# Patient Record
Sex: FEMALE | Race: BLACK OR AFRICAN AMERICAN | NOT HISPANIC OR LATINO | Employment: OTHER | URBAN - METROPOLITAN AREA
[De-identification: names, ages, dates, MRNs, and addresses within clinical notes are randomized per-mention and may not be internally consistent; named-entity substitution may affect disease eponyms.]

---

## 2018-11-06 ENCOUNTER — OFFICE VISIT (OUTPATIENT)
Dept: INTERNAL MEDICINE | Facility: CLINIC | Age: 23
End: 2018-11-06

## 2018-11-06 VITALS
SYSTOLIC BLOOD PRESSURE: 104 MMHG | BODY MASS INDEX: 28.19 KG/M2 | HEART RATE: 82 BPM | OXYGEN SATURATION: 100 % | HEIGHT: 66 IN | WEIGHT: 175.4 LBS | TEMPERATURE: 98.5 F | DIASTOLIC BLOOD PRESSURE: 78 MMHG

## 2018-11-06 DIAGNOSIS — Z00.00 ENCOUNTER FOR ANNUAL PHYSICAL EXAM: Primary | ICD-10-CM

## 2018-11-06 DIAGNOSIS — E53.8 B12 DEFICIENCY: ICD-10-CM

## 2018-11-06 DIAGNOSIS — Z23 NEEDS FLU SHOT: ICD-10-CM

## 2018-11-06 DIAGNOSIS — Z13.29 THYROID DISORDER SCREEN: ICD-10-CM

## 2018-11-06 DIAGNOSIS — E55.9 VITAMIN D DEFICIENCY: ICD-10-CM

## 2018-11-06 DIAGNOSIS — Z13.220 LIPID SCREENING: ICD-10-CM

## 2018-11-06 LAB
25(OH)D3 SERPL-MCNC: 10.2 NG/ML
ALBUMIN SERPL-MCNC: 4.28 G/DL (ref 3.2–4.8)
ALBUMIN/GLOB SERPL: 1.5 G/DL (ref 1.5–2.5)
ALP SERPL-CCNC: 58 U/L (ref 25–100)
ALT SERPL W P-5'-P-CCNC: 9 U/L (ref 7–40)
ANION GAP SERPL CALCULATED.3IONS-SCNC: 4 MMOL/L (ref 3–11)
AST SERPL-CCNC: 15 U/L (ref 0–33)
BILIRUB BLD-MCNC: NEGATIVE MG/DL
BILIRUB SERPL-MCNC: 0.4 MG/DL (ref 0.3–1.2)
BUN BLD-MCNC: 7 MG/DL (ref 9–23)
BUN/CREAT SERPL: 10.3 (ref 7–25)
CALCIUM SPEC-SCNC: 9.1 MG/DL (ref 8.7–10.4)
CHLORIDE SERPL-SCNC: 105 MMOL/L (ref 99–109)
CHOLEST SERPL-MCNC: 179 MG/DL (ref 0–200)
CLARITY, POC: CLEAR
CO2 SERPL-SCNC: 25 MMOL/L (ref 20–31)
COLOR UR: YELLOW
CREAT BLD-MCNC: 0.68 MG/DL (ref 0.6–1.3)
DEPRECATED RDW RBC AUTO: 46.2 FL (ref 37–54)
ERYTHROCYTE [DISTWIDTH] IN BLOOD BY AUTOMATED COUNT: 13.6 % (ref 11.3–14.5)
GFR SERPL CREATININE-BSD FRML MDRD: 131 ML/MIN/1.73
GLOBULIN UR ELPH-MCNC: 2.9 GM/DL
GLUCOSE BLD-MCNC: 80 MG/DL (ref 70–100)
GLUCOSE UR STRIP-MCNC: NEGATIVE MG/DL
HCT VFR BLD AUTO: 34.6 % (ref 34.5–44)
HGB BLD-MCNC: 11.3 G/DL (ref 11.5–15.5)
KETONES UR QL: NEGATIVE
LEUKOCYTE EST, POC: NEGATIVE
MCH RBC QN AUTO: 30.3 PG (ref 27–31)
MCHC RBC AUTO-ENTMCNC: 32.7 G/DL (ref 32–36)
MCV RBC AUTO: 92.8 FL (ref 80–99)
NITRITE UR-MCNC: NEGATIVE MG/ML
PH UR: 6 [PH] (ref 5–8)
PLATELET # BLD AUTO: 387 10*3/MM3 (ref 150–450)
PMV BLD AUTO: 10.1 FL (ref 6–12)
POTASSIUM BLD-SCNC: 4.2 MMOL/L (ref 3.5–5.5)
PROT SERPL-MCNC: 7.2 G/DL (ref 5.7–8.2)
PROT UR STRIP-MCNC: NEGATIVE MG/DL
RBC # BLD AUTO: 3.73 10*6/MM3 (ref 3.89–5.14)
RBC # UR STRIP: NEGATIVE /UL
SODIUM BLD-SCNC: 134 MMOL/L (ref 132–146)
SP GR UR: 1.02 (ref 1–1.03)
TSH SERPL DL<=0.05 MIU/L-ACNC: 1.77 MIU/ML (ref 0.35–5.35)
UROBILINOGEN UR QL: NORMAL
VIT B12 BLD-MCNC: 687 PG/ML (ref 211–911)
WBC NRBC COR # BLD: 4.34 10*3/MM3 (ref 3.5–10.8)

## 2018-11-06 PROCEDURE — 99385 PREV VISIT NEW AGE 18-39: CPT | Performed by: NURSE PRACTITIONER

## 2018-11-06 PROCEDURE — 82465 ASSAY BLD/SERUM CHOLESTEROL: CPT | Performed by: NURSE PRACTITIONER

## 2018-11-06 PROCEDURE — 82306 VITAMIN D 25 HYDROXY: CPT | Performed by: NURSE PRACTITIONER

## 2018-11-06 PROCEDURE — 90471 IMMUNIZATION ADMIN: CPT | Performed by: NURSE PRACTITIONER

## 2018-11-06 PROCEDURE — 90674 CCIIV4 VAC NO PRSV 0.5 ML IM: CPT | Performed by: NURSE PRACTITIONER

## 2018-11-06 PROCEDURE — 82607 VITAMIN B-12: CPT | Performed by: NURSE PRACTITIONER

## 2018-11-06 PROCEDURE — 81003 URINALYSIS AUTO W/O SCOPE: CPT | Performed by: NURSE PRACTITIONER

## 2018-11-06 PROCEDURE — 80053 COMPREHEN METABOLIC PANEL: CPT | Performed by: NURSE PRACTITIONER

## 2018-11-06 PROCEDURE — 84443 ASSAY THYROID STIM HORMONE: CPT | Performed by: NURSE PRACTITIONER

## 2018-11-06 PROCEDURE — 85027 COMPLETE CBC AUTOMATED: CPT | Performed by: NURSE PRACTITIONER

## 2018-11-06 NOTE — PROGRESS NOTES
"Subjective   Eliza Hilliard is a 23 y.o. female here to establish care.  Chief Complaint   Patient presents with   • Establish Care     new patient   • Annual Exam     physical       History of Present Illness   No acute complaints today. Here for annual exam  SBE: Does routinely, denies concerning areas.     Menses- regular, 28 day cycle, used pad and tampons, not overly painful,  Heavy flow for 5 days, no clots.     Health maintenance:   Influenza: TODAY  Tdap: 2016  Pap: declined  Tobacco use: denies  Eye exam: 2017  Dental exam: 3/2018    Diet: \"not very good\"   Eats out about 3 times a week.   Does not eat a lot of fruits and veggies.     Exercise: dances and plays sports once a week.     The following portions of the patient's history were reviewed and updated as appropriate: allergies, current medications, past family history, past medical history, past social history, past surgical history and problem list.    Review of Systems   Constitutional: Negative for appetite change, chills, fatigue and fever.   HENT: Negative for congestion, ear pain, rhinorrhea, sinus pressure and sore throat.    Eyes: Negative for itching and visual disturbance.   Respiratory: Negative for cough, shortness of breath and wheezing.    Cardiovascular: Negative for chest pain, palpitations and leg swelling.   Gastrointestinal: Negative for abdominal pain, constipation, diarrhea, nausea and vomiting.   Endocrine: Negative for cold intolerance and heat intolerance.   Genitourinary: Negative for difficulty urinating, dysuria and hematuria.   Musculoskeletal: Negative for arthralgias, back pain, joint swelling and myalgias.   Skin: Negative for rash and wound.   Allergic/Immunologic: Negative for environmental allergies and food allergies.   Neurological: Negative for dizziness, numbness and headaches.   Hematological: Negative for adenopathy. Does not bruise/bleed easily.   Psychiatric/Behavioral: Negative for dysphoric mood. The patient " "is not nervous/anxious.      Blood pressure 104/78, pulse 82, temperature 98.5 °F (36.9 °C), temperature source Temporal Artery , height 168.7 cm (66.4\"), weight 79.6 kg (175 lb 6.4 oz), SpO2 100 %.    No Known Allergies  Past Medical History:   Diagnosis Date   • Asthma     as a child   • Depression    • Dizziness     has had frequently   • Irregular heartbeat     sometimes has chest pain also     Past Surgical History:   Procedure Laterality Date   • NO PAST SURGERIES       Family History   Problem Relation Age of Onset   • No Known Problems Mother    • No Known Problems Father    • Breast cancer Maternal Aunt 50   • Hypertension Maternal Grandfather      Social History     Social History   • Marital status: Single     Spouse name: N/A   • Number of children: 0   • Years of education: 12+     Occupational History   • unemployed      Social History Main Topics   • Smoking status: Never Smoker   • Smokeless tobacco: Never Used   • Alcohol use 0.6 oz/week     1 Cans of beer per week      Comment: couple times a week   • Drug use: No   • Sexual activity: No     Other Topics Concern   • Not on file     Social History Narrative    Lives awth roommates.     Drives, wears seat belt.      Immunization History   Administered Date(s) Administered   • Tdap 01/01/2016     No current outpatient prescriptions on file.    Objective   Physical Exam   Constitutional: She is oriented to person, place, and time. She appears well-developed and well-nourished. No distress.   HENT:   Head: Normocephalic and atraumatic.   Right Ear: External ear normal.   Left Ear: External ear normal.   Nose: Nose normal.   Mouth/Throat: Oropharynx is clear and moist.   Eyes: Pupils are equal, round, and reactive to light. Conjunctivae and EOM are normal. Right eye exhibits no discharge. Left eye exhibits no discharge. No scleral icterus.   Neck: Normal range of motion. Neck supple. No JVD present. Carotid bruit is not present. No tracheal deviation " present. No thyromegaly present.   Cardiovascular: Normal rate, regular rhythm, normal heart sounds and intact distal pulses.  Exam reveals no gallop and no friction rub.    No murmur heard.  Pulmonary/Chest: Effort normal and breath sounds normal. No respiratory distress. She has no wheezes. She has no rales. She exhibits no tenderness. Right breast exhibits no inverted nipple, no mass, no nipple discharge, no skin change and no tenderness. Left breast exhibits no inverted nipple, no mass, no nipple discharge, no skin change and no tenderness. Breasts are symmetrical. There is no breast swelling.   Abdominal: Soft. Normal appearance and bowel sounds are normal. She exhibits no distension and no mass. There is no hepatosplenomegaly. There is no tenderness. No hernia.   Genitourinary: No breast tenderness, discharge or bleeding.   Musculoskeletal: Normal range of motion. She exhibits no edema, tenderness or deformity.   Lymphadenopathy:        Head (right side): No submental, no submandibular, no tonsillar, no preauricular, no posterior auricular and no occipital adenopathy present.        Head (left side): No submental, no submandibular, no tonsillar, no preauricular, no posterior auricular and no occipital adenopathy present.     She has no cervical adenopathy.     She has no axillary adenopathy.   Neurological: She is alert and oriented to person, place, and time. She has normal reflexes. She displays normal reflexes.   Skin: Skin is warm and dry. Capillary refill takes less than 2 seconds. No rash noted. She is not diaphoretic. No erythema. No pallor.   Psychiatric: She has a normal mood and affect. Her behavior is normal. Thought content normal.   Nursing note and vitals reviewed.      Assessment/Plan   Eliza was seen today for establish care and annual exam.    Diagnoses and all orders for this visit:    Encounter for annual physical exam  -     POC Urinalysis Dipstick, Automated  -     CBC (No Diff)  -      Comprehensive Metabolic Panel  -     TSH  -     Vitamin B12  -     Vitamin D 25 Hydroxy  -     Cholesterol, Total    Needs flu shot  -     Flucelvax Quad=>4Years (1404-1614)    Vitamin D deficiency  -     Vitamin D 25 Hydroxy    B12 deficiency  -     Vitamin B12    Thyroid disorder screen  -     TSH    Lipid screening  -     Cholesterol, Total    BMI 28.0-28.9,adult  -     TSH  -     Cholesterol, Total        No outpatient encounter prescriptions on file as of 11/6/2018.     No facility-administered encounter medications on file as of 11/6/2018.        Imm/screenings: Flu vaccine updated, declined pap today,   Labs sent- will notify of results once available.  Counseling: diet and execise  Return in about 1 year (around 11/6/2019) for Annual.     Plan of care discussed with pt. They verbalized understanding and agreement.

## 2018-11-09 ENCOUNTER — TELEPHONE (OUTPATIENT)
Dept: INTERNAL MEDICINE | Facility: CLINIC | Age: 23
End: 2018-11-09

## 2018-11-09 PROBLEM — E55.9 VITAMIN D DEFICIENCY: Status: ACTIVE | Noted: 2018-11-09

## 2018-11-09 RX ORDER — CHOLECALCIFEROL (VITAMIN D3) 1250 MCG
50000 CAPSULE ORAL
Qty: 8 CAPSULE | Refills: 0 | Status: SHIPPED | OUTPATIENT
Start: 2018-11-09 | End: 2020-01-30

## 2018-11-09 NOTE — TELEPHONE ENCOUNTER
----- Message from VONNIE Ojeda sent at 11/9/2018  1:56 PM EST -----  Please let her know labs in acceptable ranges except the vit d is low. Ill send in vit d3 [id number] units for her to take weekly for 8 weeks then we can recheck labs in about 3 m

## 2020-01-30 ENCOUNTER — OFFICE VISIT (OUTPATIENT)
Dept: FAMILY MEDICINE CLINIC | Facility: CLINIC | Age: 25
End: 2020-01-30

## 2020-01-30 VITALS
HEIGHT: 66 IN | HEART RATE: 124 BPM | BODY MASS INDEX: 29.38 KG/M2 | WEIGHT: 182.8 LBS | DIASTOLIC BLOOD PRESSURE: 68 MMHG | RESPIRATION RATE: 14 BRPM | SYSTOLIC BLOOD PRESSURE: 118 MMHG | OXYGEN SATURATION: 99 %

## 2020-01-30 DIAGNOSIS — R51.9 PERSISTENT HEADACHES: Primary | ICD-10-CM

## 2020-01-30 PROCEDURE — 99213 OFFICE O/P EST LOW 20 MIN: CPT | Performed by: PHYSICIAN ASSISTANT

## 2020-01-30 RX ORDER — SUMATRIPTAN 50 MG/1
TABLET, FILM COATED ORAL
Qty: 9 TABLET | Refills: 11 | Status: SHIPPED | OUTPATIENT
Start: 2020-01-30 | End: 2022-09-19

## 2020-01-30 RX ORDER — LEVONORGESTREL AND ETHINYL ESTRADIOL 0.15-0.03
1 KIT ORAL DAILY
COMMUNITY

## 2020-01-30 NOTE — PROGRESS NOTES
Subjective   Eliza Hilliard is a 24 y.o. female  Establish Care and Headache (Occur every few weeks, no known triggers)      History of Present Illness  Patient is a new patient she is a 24-year-old black female who comes in complaining of persistent headaches over the last couple months she describes her headaches as 6 headache she has unilateral headache with photophobia occasional phonophobia she states these occur about once a week she is tried over-the-counter medication without any relief she states she is under a lot of stress and has some anxiety but these headaches are pretty consistent she describes the headache 7 out of 10.      The following portions of the patient's history were reviewed and updated as appropriate: allergies, current medications, past social history and problem list    Review of Systems   Constitutional: Negative for appetite change, diaphoresis, fatigue and unexpected weight change.   Eyes: Negative for visual disturbance.   Respiratory: Negative for cough, chest tightness and shortness of breath.    Cardiovascular: Negative for chest pain, palpitations and leg swelling.   Gastrointestinal: Negative for diarrhea, nausea and vomiting.   Endocrine: Negative for polydipsia, polyphagia and polyuria.   Skin: Negative for color change and rash.   Neurological: Positive for headaches. Negative for dizziness, syncope, weakness, light-headedness and numbness.       Objective     Vitals:    01/30/20 1441   BP: 118/68   Pulse: (!) 124   Resp: 14   SpO2: 99%       Physical Exam   Constitutional: She appears well-developed and well-nourished.   Neck: Neck supple. No JVD present. No thyromegaly present.   Cardiovascular: Normal rate, regular rhythm, normal heart sounds, intact distal pulses and normal pulses.   No murmur heard.  Pulmonary/Chest: Effort normal and breath sounds normal. No respiratory distress.   Abdominal: Soft. Bowel sounds are normal. There is no hepatosplenomegaly. There is no  tenderness.   Musculoskeletal: She exhibits no edema.   Lymphadenopathy:     She has no cervical adenopathy.   Neurological: No sensory deficit.   Skin: Skin is warm and dry. She is not diaphoretic.   Nursing note and vitals reviewed.      Assessment/Plan     Eliza was seen today for establish care and headache.    Diagnoses and all orders for this visit:    Persistent headaches  -     SUMAtriptan (IMITREX) 50 MG tablet; Take one tablet at onset of headache. May repeat dose one time in 2 hours if headache not relieved.    Follow-up in 2 weeks to recheck

## 2020-02-06 ENCOUNTER — OFFICE VISIT (OUTPATIENT)
Dept: FAMILY MEDICINE CLINIC | Facility: CLINIC | Age: 25
End: 2020-02-06

## 2020-02-06 VITALS
HEART RATE: 79 BPM | RESPIRATION RATE: 14 BRPM | OXYGEN SATURATION: 99 % | HEIGHT: 66 IN | SYSTOLIC BLOOD PRESSURE: 120 MMHG | DIASTOLIC BLOOD PRESSURE: 74 MMHG | BODY MASS INDEX: 29.52 KG/M2

## 2020-02-06 DIAGNOSIS — G43.109 MIGRAINE WITH AURA AND WITHOUT STATUS MIGRAINOSUS, NOT INTRACTABLE: Primary | ICD-10-CM

## 2020-02-06 DIAGNOSIS — F41.1 GAD (GENERALIZED ANXIETY DISORDER): ICD-10-CM

## 2020-02-06 PROCEDURE — 99213 OFFICE O/P EST LOW 20 MIN: CPT | Performed by: PHYSICIAN ASSISTANT

## 2020-02-06 RX ORDER — ESCITALOPRAM OXALATE 10 MG/1
10 TABLET ORAL DAILY
Qty: 30 TABLET | Refills: 11 | Status: SHIPPED | OUTPATIENT
Start: 2020-02-06 | End: 2022-09-19

## 2020-02-06 NOTE — PROGRESS NOTES
Subjective   Eliza Hilliard is a 24 y.o. female  Headache (1 wk f/u since starting Imitrex-working well. )      History of Present Illness  Patient is a pleasant 21-year-old white female who comes in complaining of headache x1 week patient states she started taking Imitrex last week it worked well she states headaches are improved she still had some symptoms of breakthrough anxiety with focus concentration trouble getting outside without being nervous no shortness breath no chest pain  The following portions of the patient's history were reviewed and updated as appropriate: allergies, current medications, past social history and problem list    Review of Systems   Constitutional: Negative for appetite change, diaphoresis, fatigue and unexpected weight change.   Eyes: Negative for visual disturbance.   Respiratory: Negative for cough, chest tightness and shortness of breath.    Cardiovascular: Negative for chest pain, palpitations and leg swelling.   Gastrointestinal: Negative for diarrhea, nausea and vomiting.   Endocrine: Negative for polydipsia, polyphagia and polyuria.   Skin: Negative for color change and rash.   Neurological: Negative for dizziness, syncope, weakness, light-headedness, numbness and headaches.       Objective     Vitals:    02/06/20 0911   BP: 120/74   Pulse: 79   Resp: 14   SpO2: 99%       Physical Exam   Constitutional: She appears well-developed and well-nourished.   Neck: Neck supple. No JVD present. No thyromegaly present.   Cardiovascular: Normal rate, regular rhythm, normal heart sounds, intact distal pulses and normal pulses.   No murmur heard.  Pulmonary/Chest: Effort normal and breath sounds normal. No respiratory distress.   Abdominal: Soft. Bowel sounds are normal. There is no hepatosplenomegaly. There is no tenderness.   Musculoskeletal: She exhibits no edema.   Lymphadenopathy:     She has no cervical adenopathy.   Neurological: No sensory deficit.   Skin: Skin is warm and dry. She  is not diaphoretic.   Nursing note and vitals reviewed.      Assessment/Plan     Eliza was seen today for headache.    Diagnoses and all orders for this visit:    Migraine with aura and without status migrainosus, not intractable    RORY (generalized anxiety disorder)  -     escitalopram (LEXAPRO) 10 MG tablet; Take 1 tablet by mouth Daily.    #1 continue Imitrex    2.  Lexapro 10 mg 1 p.o. every day #30

## 2020-04-03 ENCOUNTER — NURSE TRIAGE (OUTPATIENT)
Dept: CALL CENTER | Facility: HOSPITAL | Age: 25
End: 2020-04-03

## 2020-04-03 NOTE — TELEPHONE ENCOUNTER
"Given the number for the testing center, given suggestions on how to treat the symptoms, is self quarantine, [facility]    Reason for Disposition  • [1] Sore throat with cough/cold symptoms AND [2] present < 5 days  • COVID-19 Testing, questions about    Additional Information  • Negative: Difficulty breathing, severe  • Negative: [1] Wheezing (high pitched whistling sound) AND [2] previous asthma attacks or use of asthma medicines  • Negative: Earache  • Negative: Sinus pain or pressure  • Negative: Cough, productive of sputum (phlegm)  • Negative: Cough, not productive (dry cough)  • Sore throat  • Negative: Severe difficulty breathing (e.g., struggling for each breath, speaks in single words, stridor)  • Negative: Sounds like a life-threatening emergency to the triager  • Negative: [1] Diagnosed strep throat AND [2] taking antibiotic AND [3] symptoms continue  • Negative: Throat culture results, call about  • Negative: Productive cough is main symptom  • Negative: Non-productive cough is main symptom  • Negative: Hoarseness is main symptom  • Negative: Runny nose is main symptom  • Negative: [1] Drooling or spitting out saliva (because can't swallow) AND [2] normal breathing  • Negative: Unable to open mouth completely  • Negative: [1] Difficulty breathing AND [2] not severe  • Negative: Fever > 104 F (40 C)  • Negative: [1] Refuses to drink anything AND [2] for > 12 hours  • Negative: [1] Drinking very little AND [2] dehydration suspected (e.g., no urine > 12 hours, very dry mouth, very lightheaded)  • Negative: Patient sounds very sick or weak to the triager  • Negative: SEVERE (e.g., excruciating) throat pain  • Negative: [1] Pus on tonsils (back of throat) AND [2]  fever AND [3] swollen neck lymph nodes (\"glands\")  • Negative: [1] Rash AND [2] widespread (especially chest and abdomen)  • Negative: Earache also present  • Negative: Fever present > 3 days (72 hours)  • " Negative: Diabetes mellitus or weak immune system (e.g., HIV positive, cancer chemo, splenectomy, organ transplant, chronic steroids)  • Negative: History of rheumatic fever  • Negative: [1] Adult is leaving on a trip AND [2] requests an antibiotic NOW  • Negative: [1] Positive throat culture or rapid strep test (according to lab, PCP, caller, etc.) AND [2] NO  standing order to call in prescription for antibiotic  • Negative: [1] Exposure to family member (or spouse or boyfriend/girlfriend) with test-proven strep AND [2] within last 10 days  • Negative: [1] Sore throat is the only symptom AND [2] present > 48 hours  • Negative: [1] Sore throat with cough/cold symptoms AND [2] present > 5 days  • Negative: [1] Sore throat is the only symptom AND [2] sore throat present < 48 hours  • Negative: SEVERE difficulty breathing (e.g., struggling for each breath, speaks in single words)  • Negative: Difficult to awaken or acting confused (e.g., disoriented, slurred speech)  • Negative: Bluish (or gray) lips or face now  • Negative: Shock suspected (e.g., cold/pale/clammy skin, too weak to stand, low BP, rapid pulse)  • Negative: Sounds like a life-threatening emergency to the triager  • Negative: [1] COVID-19 exposure AND [2] no symptoms  • [1] COVID-19 suspected (e.g., cough, fever, shortness of breath) AND [2] public health department recommends testing  • Negative: COVID-19 and Breastfeeding, questions about  • Negative: SEVERE or constant chest pain (Exception: mild central chest pain, present only when coughing)  • Negative: MODERATE difficulty breathing (e.g., speaks in phrases, SOB even at rest, pulse 100-120)  • Negative: Patient sounds very sick or weak to the triager  • Negative: MILD difficulty breathing (e.g., minimal/no SOB at rest, SOB with walking, pulse <100)  • Negative: Chest pain  • Negative: Fever > 103 F (39.4 C)  • Negative: [1] Fever > 101 F (38.3 C) AND [2] age > 60  • Negative: [1] Fever > 100.0 F  "(37.8 C) AND [2] bedridden (e.g., nursing home patient, CVA, chronic illness, recovering from surgery)  • Negative: HIGH RISK patient (e.g., age > 64 years, diabetes, heart or lung disease, weak immune system)  • Negative: Fever present > 3 days (72 hours)  • Negative: [1] Fever returns after gone for over 24 hours AND [2] symptoms worse or not improved  • Negative: [1] Continuous (nonstop) coughing interferes with work or school AND [2] no improvement using cough treatment per protocol  • Negative: Cough present > 3 weeks  • Negative: 1] COVID-19 infection diagnosed or suspected AND [2] mild symptoms (fever, cough) AND [2] no trouble breathing or other complications  • Negative: COVID-19, questions about  • Negative: COVID-19 Home Isolation, questions about  • Negative: COVID-19 Prevention and Healthy Living, questions about    Answer Assessment - Initial Assessment Questions  1. ONSET: \"When did the throat start hurting?\" (Hours or days ago)       Couple days  2. SEVERITY: \"How bad is the sore throat?\" (Scale 1-10; mild, moderate or severe)    - MILD (1-3):  doesn't interfere with eating or normal activities    - MODERATE (4-7): interferes with eating some solids and normal activities    - SEVERE (8-10):  excruciating pain, interferes with most normal activities    - SEVERE DYSPHAGIA: can't swallow liquids, drooling      moderate  3. STREP EXPOSURE: \"Has there been any exposure to strep within the past week?\" If so, ask: \"What type of contact occurred?\"      no  4.  VIRAL SYMPTOMS: \"Are there any symptoms of a cold, such as a runny nose, cough, hoarse voice or red eyes?\"       Cold, body aches  5. FEVER: \"Do you have a fever?\" If so, ask: \"What is your temperature, how was it measured, and when did it start?\"      99  6. PUS ON THE TONSILS: \"Is there pus on the tonsils in the back of your throat?\"      na  7. OTHER SYMPTOMS: \"Do you have any other symptoms?\" (e.g., difficulty breathing, headache, rash)      Body " "aches  8. PREGNANCY: \"Is there any chance you are pregnant?\" \"When was your last menstrual period?\"      no    Answer Assessment - Initial Assessment Questions  1. COVID-19 DIAGNOSIS: \"Who made your Coronavirus (COVID-19) diagnosis?\" \"Was it confirmed by a positive lab test?\" If not diagnosed by a HCP, ask \"Are there lots of cases (community spread) where you live?\" (See public health department website, if unsure)    * MAJOR community spread: high number of cases; numbers of cases are increasing; many people hospitalized.    * MINOR community spread: low number of cases; not increasing; few or no people hospitalized      na  2. ONSET: \"When did the COVID-19 symptoms start?\"        2 days  3. WORST SYMPTOM: \"What is your worst symptom?\" (e.g., cough, fever, shortness of breath, muscle aches)      bodyaches  4. COUGH: \"How bad is the cough?\"        yes  5. FEVER: \"Do you have a fever?\" If so, ask: \"What is your temperature, how was it measured, and when did it start?\"      no  6. RESPIRATORY STATUS: \"Describe your breathing?\" (e.g., shortness of breath, wheezing, unable to speak)       no  7. BETTER-SAME-WORSE: \"Are you getting better, staying the same or getting worse compared to yesterday?\"  If getting worse, ask, \"In what way?\"     Low grade fecer started today  8. HIGH RISK DISEASE: \"Do you have any chronic medical problems?\" (e.g., asthma, heart or lung disease, weak immune system, etc.)      no  9. PREGNANCY: \"Is there any chance you are pregnant?\" \"When was your last menstrual period?\"      No   10. OTHER SYMPTOMS: \"Do you have any other symptoms?\"  (e.g., runny nose, headache, sore throat, loss of smell)        Sore throat    Protocols used: SORE THROAT-ADULT-AH, CORONAVIRUS (COVID-19) DIAGNOSED OR SUSPECTED-ADULT-AH, RESPIRATORY MULTIPLE SYMPTOMS - GUIDELINE SELECTION-ADULT-AH      "

## 2020-04-06 ENCOUNTER — TELEPHONE (OUTPATIENT)
Dept: CALL CENTER | Facility: HOSPITAL | Age: 25
End: 2020-04-06

## 2022-09-04 ENCOUNTER — APPOINTMENT (OUTPATIENT)
Dept: CT IMAGING | Facility: HOSPITAL | Age: 27
End: 2022-09-04

## 2022-09-04 ENCOUNTER — APPOINTMENT (OUTPATIENT)
Dept: GENERAL RADIOLOGY | Facility: HOSPITAL | Age: 27
End: 2022-09-04

## 2022-09-04 ENCOUNTER — HOSPITAL ENCOUNTER (EMERGENCY)
Facility: HOSPITAL | Age: 27
Discharge: HOME | End: 2022-09-04
Attending: EMERGENCY MEDICINE | Admitting: EMERGENCY MEDICINE

## 2022-09-04 VITALS
TEMPERATURE: 98.1 F | HEIGHT: 66 IN | HEART RATE: 67 BPM | DIASTOLIC BLOOD PRESSURE: 77 MMHG | OXYGEN SATURATION: 99 % | SYSTOLIC BLOOD PRESSURE: 111 MMHG | RESPIRATION RATE: 18 BRPM | WEIGHT: 180 LBS | BODY MASS INDEX: 28.93 KG/M2

## 2022-09-04 DIAGNOSIS — R00.2 PALPITATIONS: ICD-10-CM

## 2022-09-04 DIAGNOSIS — U07.1 COVID-19 VIRUS INFECTION: ICD-10-CM

## 2022-09-04 DIAGNOSIS — R07.9 CHEST PAIN, UNSPECIFIED TYPE: Primary | ICD-10-CM

## 2022-09-04 LAB
ALBUMIN SERPL-MCNC: 4.2 G/DL (ref 3.5–5.2)
ALBUMIN/GLOB SERPL: 1.2 G/DL
ALP SERPL-CCNC: 46 U/L (ref 39–117)
ALT SERPL W P-5'-P-CCNC: 8 U/L (ref 1–33)
ANION GAP SERPL CALCULATED.3IONS-SCNC: 10 MMOL/L (ref 5–15)
AST SERPL-CCNC: 16 U/L (ref 1–32)
B-HCG UR QL: NEGATIVE
BASOPHILS # BLD AUTO: 0.02 10*3/MM3 (ref 0–0.2)
BASOPHILS NFR BLD AUTO: 0.3 % (ref 0–1.5)
BILIRUB SERPL-MCNC: 0.5 MG/DL (ref 0–1.2)
BUN SERPL-MCNC: 7 MG/DL (ref 6–20)
BUN/CREAT SERPL: 8.5 (ref 7–25)
CALCIUM SPEC-SCNC: 9 MG/DL (ref 8.6–10.5)
CHLORIDE SERPL-SCNC: 104 MMOL/L (ref 98–107)
CO2 SERPL-SCNC: 25 MMOL/L (ref 22–29)
CREAT SERPL-MCNC: 0.82 MG/DL (ref 0.57–1)
DEPRECATED RDW RBC AUTO: 41.1 FL (ref 37–54)
EGFRCR SERPLBLD CKD-EPI 2021: 101.3 ML/MIN/1.73
EOSINOPHIL # BLD AUTO: 0.11 10*3/MM3 (ref 0–0.4)
EOSINOPHIL NFR BLD AUTO: 1.4 % (ref 0.3–6.2)
ERYTHROCYTE [DISTWIDTH] IN BLOOD BY AUTOMATED COUNT: 11.9 % (ref 12.3–15.4)
EXPIRATION DATE: NORMAL
GLOBULIN UR ELPH-MCNC: 3.6 GM/DL
GLUCOSE SERPL-MCNC: 104 MG/DL (ref 65–99)
HCT VFR BLD AUTO: 38.7 % (ref 34–46.6)
HGB BLD-MCNC: 13.3 G/DL (ref 12–15.9)
HOLD SPECIMEN: NORMAL
IMM GRANULOCYTES # BLD AUTO: 0.02 10*3/MM3 (ref 0–0.05)
IMM GRANULOCYTES NFR BLD AUTO: 0.3 % (ref 0–0.5)
INTERNAL NEGATIVE CONTROL: NEGATIVE
INTERNAL POSITIVE CONTROL: POSITIVE
LYMPHOCYTES # BLD AUTO: 3.52 10*3/MM3 (ref 0.7–3.1)
LYMPHOCYTES NFR BLD AUTO: 44.8 % (ref 19.6–45.3)
Lab: NORMAL
MCH RBC QN AUTO: 32.4 PG (ref 26.6–33)
MCHC RBC AUTO-ENTMCNC: 34.4 G/DL (ref 31.5–35.7)
MCV RBC AUTO: 94.2 FL (ref 79–97)
MONOCYTES # BLD AUTO: 0.76 10*3/MM3 (ref 0.1–0.9)
MONOCYTES NFR BLD AUTO: 9.7 % (ref 5–12)
NEUTROPHILS NFR BLD AUTO: 3.43 10*3/MM3 (ref 1.7–7)
NEUTROPHILS NFR BLD AUTO: 43.5 % (ref 42.7–76)
NRBC BLD AUTO-RTO: 0 /100 WBC (ref 0–0.2)
NT-PROBNP SERPL-MCNC: 88.9 PG/ML (ref 0–450)
PLATELET # BLD AUTO: 381 10*3/MM3 (ref 140–450)
PMV BLD AUTO: 9.6 FL (ref 6–12)
POTASSIUM SERPL-SCNC: 3.4 MMOL/L (ref 3.5–5.2)
PROT SERPL-MCNC: 7.8 G/DL (ref 6–8.5)
QT INTERVAL: 384 MS
QTC INTERVAL: 426 MS
RBC # BLD AUTO: 4.11 10*6/MM3 (ref 3.77–5.28)
SODIUM SERPL-SCNC: 139 MMOL/L (ref 136–145)
TROPONIN T SERPL-MCNC: <0.01 NG/ML (ref 0–0.03)
WBC NRBC COR # BLD: 7.86 10*3/MM3 (ref 3.4–10.8)
WHOLE BLOOD HOLD COAG: NORMAL
WHOLE BLOOD HOLD SPECIMEN: NORMAL

## 2022-09-04 PROCEDURE — 99284 EMERGENCY DEPT VISIT MOD MDM: CPT

## 2022-09-04 PROCEDURE — 80053 COMPREHEN METABOLIC PANEL: CPT | Performed by: EMERGENCY MEDICINE

## 2022-09-04 PROCEDURE — 96374 THER/PROPH/DIAG INJ IV PUSH: CPT

## 2022-09-04 PROCEDURE — 81025 URINE PREGNANCY TEST: CPT | Performed by: EMERGENCY MEDICINE

## 2022-09-04 PROCEDURE — 83880 ASSAY OF NATRIURETIC PEPTIDE: CPT | Performed by: EMERGENCY MEDICINE

## 2022-09-04 PROCEDURE — 0 IOPAMIDOL PER 1 ML: Performed by: EMERGENCY MEDICINE

## 2022-09-04 PROCEDURE — 71275 CT ANGIOGRAPHY CHEST: CPT

## 2022-09-04 PROCEDURE — 93005 ELECTROCARDIOGRAM TRACING: CPT

## 2022-09-04 PROCEDURE — 99283 EMERGENCY DEPT VISIT LOW MDM: CPT

## 2022-09-04 PROCEDURE — 93005 ELECTROCARDIOGRAM TRACING: CPT | Performed by: EMERGENCY MEDICINE

## 2022-09-04 PROCEDURE — 84484 ASSAY OF TROPONIN QUANT: CPT | Performed by: EMERGENCY MEDICINE

## 2022-09-04 PROCEDURE — 85025 COMPLETE CBC W/AUTO DIFF WBC: CPT | Performed by: EMERGENCY MEDICINE

## 2022-09-04 PROCEDURE — 25010000002 KETOROLAC TROMETHAMINE PER 15 MG: Performed by: EMERGENCY MEDICINE

## 2022-09-04 RX ORDER — SODIUM CHLORIDE 0.9 % (FLUSH) 0.9 %
10 SYRINGE (ML) INJECTION AS NEEDED
Status: DISCONTINUED | OUTPATIENT
Start: 2022-09-04 | End: 2022-09-04 | Stop reason: HOSPADM

## 2022-09-04 RX ORDER — KETOROLAC TROMETHAMINE 30 MG/ML
30 INJECTION, SOLUTION INTRAMUSCULAR; INTRAVENOUS ONCE
Status: COMPLETED | OUTPATIENT
Start: 2022-09-04 | End: 2022-09-04

## 2022-09-04 RX ORDER — NAPROXEN 500 MG/1
500 TABLET ORAL 2 TIMES DAILY WITH MEALS
Qty: 20 TABLET | Refills: 0 | Status: SHIPPED | OUTPATIENT
Start: 2022-09-04

## 2022-09-04 RX ADMIN — KETOROLAC TROMETHAMINE 30 MG: 30 INJECTION, SOLUTION INTRAMUSCULAR; INTRAVENOUS at 17:19

## 2022-09-04 RX ADMIN — IOPAMIDOL 70 ML: 755 INJECTION, SOLUTION INTRAVENOUS at 18:00

## 2022-09-04 NOTE — ED PROVIDER NOTES
[facility]    EMERGENCY DEPARTMENT ENCOUNTER      Pt Name: Eliza Hilliard  MRN: [id number]  YOB: 1995  Date of evaluation: 9/4/2022  Provider: Jai Waddell DO    CHIEF COMPLAINT       Chief Complaint   Patient presents with   • Shortness of Breath         HISTORY OF PRESENT ILLNESS  (Location/Symptom, Timing/Onset, Context/Setting, Quality, Duration, Modifying Factors, Severity.)   Eliza Hilliard is a 27 y.o. female who presents to the emergency department for evaluation of intermittent retrosternal right anterior chest discomfort, cough and congestion with about 1 week history diagnosis of COVID infection.  She has had her previous vaccine, booster.  The patient states she has had a mild cough and congestion, denies any significant shortness of breath, she has had some intermittent chest discomfort over the last couple days.  Has had some intermittent palpitations.  She was doing well over the last week or so.  Pain is sharp in nature, nonradiating, noted to be in the right anterior chest.  Denies any history of DVT, PE or blood clotting disorders.  She denies any chest pain at rest.  No hemoptysis, denies any unilateral leg pain or swelling.  History of asthma, exercise-induced as a child, is well controlled.  She denies any history of any cardiac or pulmonary disease, she denies any other acute systemic complaints at this time.      Nursing notes were reviewed.    REVIEW OF SYSTEMS    (2-9 systems for level 4, 10 or more for level 5)   ROS:  General:  No fevers, no chills, no weakness  Cardiovascular:  + Right anterior chest pain, + palpitations  Respiratory:  + Mild shortness of breath, + cough, no wheezing  Gastrointestinal:  No pain, no nausea, no vomiting, no diarrhea  Musculoskeletal:  No muscle pain, no joint pain  Skin:  No rash  Neurologic:  No headache  Psychiatric:  No anxiety  Genitourinary:  No dysuria, no hematuria    Except as noted above the remainder of the review of systems  was reviewed and negative.       PAST MEDICAL HISTORY     Past Medical History:   Diagnosis Date   • Asthma     as a child   • Depression    • Dizziness     has had frequently   • Headache    • Irregular heartbeat     sometimes has chest pain also   • Wears glasses          SURGICAL HISTORY       Past Surgical History:   Procedure Laterality Date   • NO PAST SURGERIES           CURRENT MEDICATIONS       Current Facility-Administered Medications:   •  sodium chloride 0.9 % flush 10 mL, 10 mL, Intravenous, PRN, Jai Waddell,     Current Outpatient Medications:   •  albuterol sulfate  (90 Base) MCG/ACT inhaler, Inhale 2 puffs Every 4 (Four) Hours As Needed for Wheezing for up to 30 days., Disp: 8 g, Rfl: 0  •  Chlorcyclizine-Pseudoephed (Stahist AD) 25-60 MG tablet, Take 1 tablet by mouth 2 (Two) Times a Day for 10 days., Disp: 20 tablet, Rfl: 0  •  escitalopram (LEXAPRO) 10 MG tablet, Take 1 tablet by mouth Daily., Disp: 30 tablet, Rfl: 11  •  levonorgestrel-ethinyl estradiol (LEVORA 0.15/30, 28,) 0.15-30 MG-MCG per tablet, Take 1 tablet by mouth Daily., Disp: , Rfl:   •  methylPREDNISolone (MEDROL) 4 MG dose pack, Take as directed on package instructions., Disp: 21 tablet, Rfl: 0  •  ondansetron ODT (Zofran ODT) 4 MG disintegrating tablet, Place 1 tablet on the tongue Every 8 (Eight) Hours As Needed for Nausea for up to 5 days., Disp: 15 tablet, Rfl: 0  •  promethazine-dextromethorphan (PROMETHAZINE-DM) 6.25-15 MG/5ML syrup, Take 5 mL by mouth 4 (Four) Times a Day As Needed for Cough for up to 10 days., Disp: 240 mL, Rfl: 0  •  SUMAtriptan (IMITREX) 50 MG tablet, Take one tablet at onset of headache. May repeat dose one time in 2 hours if headache not relieved., Disp: 9 tablet, Rfl: 11    ALLERGIES     Patient has no known allergies.    FAMILY HISTORY       Family History   Problem Relation Age of Onset   • No Known Problems Mother    • No Known Problems Father    • Breast cancer Maternal Aunt 50  "         SOCIAL HISTORY       Social History     Socioeconomic History   • Marital status:    • Number of children: 0   • Years of education: 12+   Tobacco Use   • Smoking status: Never Smoker   • Smokeless tobacco: Never Used   Vaping Use   • Vaping Use: Never used   Substance and Sexual Activity   • Alcohol use: Yes     Comment: Rarely   • Drug use: No   • Sexual activity: Never         PHYSICAL EXAM    (up to 7 for level 4, 8 or more for level 5)     Vitals:    09/04/22 1644 09/04/22 1730 09/04/22 1830 09/04/22 2000   BP: 113/73 127/67 113/55 120/56   BP Location: Left arm      Patient Position: Sitting      Pulse: 82   66   Resp: 18   18   Temp: 98.1 °F (36.7 °C)      TempSrc: Oral      SpO2: 100% 100% 100% 99%   Weight: 81.6 kg (180 lb)      Height: 167.6 cm (66\")          Physical Exam  General : Patient is awake, alert, oriented, in no acute distress, nontoxic appearing  HEENT: Pupils are equally round, EOMI, conjunctivae clear, there is no injection no icterus.  Oral mucosa is moist, uvula midline  Neck: Neck is supple, full range of motion, trachea midline  Cardiac: Heart regular rate, rhythm, no murmurs, rubs, or gallops  Lungs: Lungs are clear to auscultation, there is no wheezing, rhonchi, or rales. There is no use of accessory muscles  Chest wall: There is no tenderness to palpation over the chest wall or over ribs  Abdomen: Abdomen is soft, nontender, nondistended. There are no firm or pulsatile masses, no rebound rigidity or guarding  Musculoskeletal: 5 out of 5 strength in all 4 extremities.  No focal muscle deficits are appreciated  Neuro: Motor intact, sensory intact, level of consciousness is normal, GCS 15  Dermatology: Skin is warm and dry  Psych: Mentation is grossly normal, cognition is grossly normal. Affect is appropriate      DIAGNOSTIC RESULTS     EKG: All EKGs are interpreted by the Emergency Department Physician who either signs or Co-signs this chart in the absence of a " cardiologist.    ECG 12 Lead   Final Result   Test Reason : SOA Protocol   Blood Pressure :   */*   mmHG   Vent. Rate :  74 BPM     Atrial Rate :  74 BPM      P-R Int : 124 ms          QRS Dur :  76 ms       QT Int : 384 ms       P-R-T Axes :  34   2   7 degrees      QTc Int : 426 ms      Normal sinus rhythm with sinus arrhythmia   t inversions abt/septal leads   no stemi   t wave flattening diffusely throughout   Abnormal ECG   No previous ECGs available   Confirmed by TOM PEREIRA MD ([id number]) on 9/4/2022 5:03:46 PM      Referred By: DONNA           Confirmed By: TOM PEREIRA MD          RADIOLOGY:   Non-plain film images such as CT, Ultrasound and MRI are read by the radiologist. Plain radiographic images are visualized and preliminarily interpreted by the emergency physician with the below findings:      [] Radiologist's Report Reviewed:  CT Angiogram Chest Pulmonary Embolism   Final Result   No evidence of pulmonary embolism. No acute intrathoracic findings.       This report was finalized on 9/4/2022 7:37 PM by Agustin Souza MD.                ED BEDSIDE ULTRASOUND:   Performed by ED Physician - none    LABS:    I have reviewed and interpreted all of the currently available lab results from this visit (if applicable):  Results for orders placed or performed during the hospital encounter of 09/04/22   Comprehensive Metabolic Panel    Specimen: Blood   Result Value Ref Range    Glucose 104 (H) 65 - 99 mg/dL    BUN 7 6 - 20 mg/dL    Creatinine 0.82 0.57 - 1.00 mg/dL    Sodium 139 136 - 145 mmol/L    Potassium 3.4 (L) 3.5 - 5.2 mmol/L    Chloride 104 98 - 107 mmol/L    CO2 25.0 22.0 - 29.0 mmol/L    Calcium 9.0 8.6 - 10.5 mg/dL    Total Protein 7.8 6.0 - 8.5 g/dL    Albumin 4.20 3.50 - 5.20 g/dL    ALT (SGPT) 8 1 - 33 U/L    AST (SGOT) 16 1 - 32 U/L    Alkaline Phosphatase 46 39 - 117 U/L    Total Bilirubin 0.5 0.0 - 1.2 mg/dL    Globulin 3.6 gm/dL    A/G Ratio 1.2 g/dL    BUN/Creatinine Ratio 8.5 7.0 - 25.0     Anion Gap 10.0 5.0 - 15.0 mmol/L    eGFR 101.3 >60.0 mL/min/1.73   BNP    Specimen: Blood   Result Value Ref Range    proBNP 88.9 0.0 - 450.0 pg/mL   Troponin    Specimen: Blood   Result Value Ref Range    Troponin T <0.010 0.000 - 0.030 ng/mL   CBC Auto Differential    Specimen: Blood   Result Value Ref Range    WBC 7.86 3.40 - 10.80 10*3/mm3    RBC 4.11 3.77 - 5.28 10*6/mm3    Hemoglobin 13.3 12.0 - 15.9 g/dL    Hematocrit 38.7 34.0 - 46.6 %    MCV 94.2 79.0 - 97.0 fL    MCH 32.4 26.6 - 33.0 pg    MCHC 34.4 31.5 - 35.7 g/dL    RDW 11.9 (L) 12.3 - 15.4 %    RDW-SD 41.1 37.0 - 54.0 fl    MPV 9.6 6.0 - 12.0 fL    Platelets 381 140 - 450 10*3/mm3    Neutrophil % 43.5 42.7 - 76.0 %    Lymphocyte % 44.8 19.6 - 45.3 %    Monocyte % 9.7 5.0 - 12.0 %    Eosinophil % 1.4 0.3 - 6.2 %    Basophil % 0.3 0.0 - 1.5 %    Immature Grans % 0.3 0.0 - 0.5 %    Neutrophils, Absolute 3.43 1.70 - 7.00 10*3/mm3    Lymphocytes, Absolute 3.52 (H) 0.70 - 3.10 10*3/mm3    Monocytes, Absolute 0.76 0.10 - 0.90 10*3/mm3    Eosinophils, Absolute 0.11 0.00 - 0.40 10*3/mm3    Basophils, Absolute 0.02 0.00 - 0.20 10*3/mm3    Immature Grans, Absolute 0.02 0.00 - 0.05 10*3/mm3    nRBC 0.0 0.0 - 0.2 /100 WBC   POC Urine Pregnancy    Specimen: Urine   Result Value Ref Range    HCG, Urine, QL Negative Negative    Lot Number [id number]     Internal Positive Control Positive Positive, Passed    Internal Negative Control Negative Negative, Passed    Expiration Date 9/30/23    ECG 12 Lead   Result Value Ref Range    QT Interval 384 ms    QTC Interval 426 ms   Green Top (Gel)   Result Value Ref Range    Extra Tube Hold for add-ons.    Lavender Top   Result Value Ref Range    Extra Tube hold for add-on    Gold Top - SST   Result Value Ref Range    Extra Tube Hold for add-ons.    Light Blue Top   Result Value Ref Range    Extra Tube Hold for add-ons.         All other labs were within normal range or not returned as of this dictation.      EMERGENCY DEPARTMENT  "COURSE and DIFFERENTIAL DIAGNOSIS/MDM:   Vitals:    Vitals:    09/04/22 1644 09/04/22 1730 09/04/22 1830 09/04/22 2000   BP: 113/73 127/67 113/55 120/56   BP Location: Left arm      Patient Position: Sitting      Pulse: 82   66   Resp: 18   18   Temp: 98.1 °F (36.7 °C)      TempSrc: Oral      SpO2: 100% 100% 100% 99%   Weight: 81.6 kg (180 lb)      Height: 167.6 cm (66\")          ED Course as of 09/04/22 2033   Sun Sep 04, 2022   1703 HEART Pathway for Early Discharge in Acute Chest Pain from [employer]  on 9/4/2022  ** All calculations should be rechecked by clinician prior to use **    RESULT SUMMARY:  0 points  HEART Pathway Score    Low risk  0.9-1.7% 30-day MACE    Repeat troponin at 3 hours and if negative, discharge home with outpatient follow-up.      INPUTS:  History -> 0 = Slightly suspicious  EKG -> 0 = Normal  Age -> 0 = <45  Risk factors -> 0 = No known risk factors  Initial troponin -> 0 = =normal limit   [AP]      ED Course User Index  [AP] Jai Waddell DO       Patient with intermittent chest pain, palpitations, recent history of COVID infection.  She presents nontoxic-appearing, no hypoxia, no tachycardia.  She does have some T wave flattening on her EKG, T wave versions anteroseptal leads.  We discussed obtaining IV, labs, CT PE study for further evaluation.  Results as above.  Patient given IV Toradol.  Blood work unremarkable.  CT scan imaging PE study does not reveal any acute pulmonary emboli or acute cardiac or thoracic process.  On reevaluation patient resting comfortably, we discussed continuing symptomatic treatments, anti-inflammatory, following up with her PCP in a few days for reevaluation, referral over to our heart valve chest pain clinic.  Return precautions discussed with the patient.    I had a discussion with the patient/family regarding diagnosis, diagnostic results, treatment plan, and medications.  The patient/family indicated understanding of these instructions.  I " spent adequate time at the bedside preceding discharge necessary to personally discuss the aftercare instructions, giving patient education, providing explanations of the results of our evaluations/findings, and my decision making to assure that the patient/family understand the plan of care.  Time was allotted to answer questions at that time and throughout the ED course.  Emphasis was placed on timely follow-up after discharge.  I also discussed the potential for the development of an acute emergent condition requiring further evaluation, admission, or even surgical intervention. I discussed that we found nothing during the visit today indicating the need for further workup, admission, or the presence of an unstable medical condition.  I encouraged the patient to return to the emergency department immediately for ANY concerns, worsening, new complaints, or if symptoms persist and unable to seek follow-up in a timely fashion.  The patient/family expressed understanding and agreement with this plan.  The patient will follow-up with their PCP in 1-2 days for reevaluation.       MEDICATIONS ADMINISTERED IN ED:  Medications   sodium chloride 0.9 % flush 10 mL (has no administration in time range)   ketorolac (TORADOL) injection 30 mg (30 mg Intravenous Given 9/4/22 1719)   iopamidol (ISOVUE-370) 76 % injection 100 mL (70 mL Intravenous Given 9/4/22 1800)       PROCEDURES:  Procedures    CRITICAL CARE TIME    Total Critical Care time was 0 minutes, excluding separately reportable procedures.   There was a high probability of clinically significant/life threatening deterioration in the patient's condition which required my urgent intervention.      FINAL IMPRESSION      1. Chest pain, unspecified type    2. Palpitations    3. COVID-19 virus infection          DISPOSITION/PLAN     ED Disposition     ED Disposition   Discharge    Condition   Stable    Comment   --             PATIENT REFERRED TO:  Rolly Mcdonald,  [place]  [address] [facility]  [address]  [facility] [place] [facility] [address]  [phone]    In 2 days      [facility] Emergency Department  [address]  [facility] [id number] [phone]    If symptoms worsen    [facility] CARDIOLOGY  [address] [facility]  [address]  [facility] [id number] [phone]  Schedule an appointment as soon as possible for a visit         DISCHARGE MEDICATIONS:     Medication List      CONTINUE taking these medications    albuterol sulfate  (90 Base) MCG/ACT inhaler  Commonly known as: PROVENTIL HFA;VENTOLIN HFA;PROAIR HFA  Inhale 2 puffs Every 4 (Four) Hours As Needed for Wheezing for up to 30 days.     escitalopram 10 MG tablet  Commonly known as: Lexapro  Take 1 tablet by mouth Daily.     Levora 0.15/30 (28) 0.15-30 MG-MCG per tablet  Generic drug: levonorgestrel-ethinyl estradiol     methylPREDNISolone 4 MG dose pack  Commonly known as: MEDROL  Take as directed on package instructions.     ondansetron ODT 4 MG disintegrating tablet  Commonly known as: Zofran ODT  Place 1 tablet on the tongue Every 8 (Eight) Hours As Needed for Nausea for up to 5 days.     promethazine-dextromethorphan 6.25-15 MG/5ML syrup  Commonly known as: PROMETHAZINE-DM  Take 5 mL by mouth 4 (Four) Times a Day As Needed for Cough for up to 10 days.     Stahist AD 25-60 MG tablet  Generic drug: Chlorcyclizine-Pseudoephed  Take 1 tablet by mouth 2 (Two) Times a Day for 10 days.     SUMAtriptan 50 MG tablet  Commonly known as: Imitrex  Take one tablet at onset of headache. May repeat dose one time in 2 hours if headache not relieved.                Comment: Please note this report has been produced using speech recognition software.      Jai Waddell DO  Attending Emergency Physician               Jai Waddell DO  09/04/22 2034

## 2022-09-19 ENCOUNTER — OFFICE VISIT (OUTPATIENT)
Dept: CARDIOLOGY | Facility: HOSPITAL | Age: 27
End: 2022-09-19

## 2022-09-19 ENCOUNTER — HOSPITAL ENCOUNTER (OUTPATIENT)
Dept: CARDIOLOGY | Facility: HOSPITAL | Age: 27
Discharge: HOME | End: 2022-09-19

## 2022-09-19 VITALS
SYSTOLIC BLOOD PRESSURE: 107 MMHG | BODY MASS INDEX: 28.15 KG/M2 | HEIGHT: 66 IN | WEIGHT: 175.13 LBS | OXYGEN SATURATION: 99 % | TEMPERATURE: 97 F | HEART RATE: 80 BPM | RESPIRATION RATE: 20 BRPM | DIASTOLIC BLOOD PRESSURE: 65 MMHG

## 2022-09-19 DIAGNOSIS — R06.09 DOE (DYSPNEA ON EXERTION): Primary | ICD-10-CM

## 2022-09-19 DIAGNOSIS — R00.2 PALPITATIONS: ICD-10-CM

## 2022-09-19 DIAGNOSIS — Z86.16 PERSONAL HISTORY OF COVID-19: ICD-10-CM

## 2022-09-19 PROCEDURE — 99213 OFFICE O/P EST LOW 20 MIN: CPT | Performed by: NURSE PRACTITIONER

## 2022-09-19 PROCEDURE — 93246 EXT ECG>7D<15D RECORDING: CPT

## 2022-09-19 NOTE — PROGRESS NOTES
[facility] Heart Monitor Documentation    Eliza Hilliard  1995  [id number]  09/19/22      [] ZIO XT Patch  Model [id number] Prescribed for N/A Days    · Serial Number: (N + 9 Digits) N   · Apply-By Date on Box:   · USPS Tracking Number:   · USPS Tracking        [] [employer] MINI PLUS Mobile Cardiac Telemetry  Model BGMINIPLUS Prescribed for N/A Days    · Serial Number: (BGM + 7 Digits) BGM  · Shipped-By Date on Box:   · UPS Tracking Number: [id number]  · UPS Tracking      [] [employer] MINI Holter Monitor  Model BGMINIEL Prescribed for 14 Days    · Serial Number: (7 Digits) [id number]  · Shipped-By Date on Box: [id number]  · UPS Tracking Number: [id number]  · UPS Tracking        This monitor was applied to the patient's chest and checked for proper functioning.  Ms. Eliza Hilliard was instructed in the proper use of this monitor.  She was given the opportunity to ask questions and left the office with the device 's instruction manual.    Haley Vegas MA, 15:34 EDT, 09/19/22                  [facility] 8[facility]

## 2022-09-19 NOTE — PROGRESS NOTES
"Chief Complaint  Chest Pain and Establish Care    Subjective    History of Present Illness {CC  Problem List  Visit  Diagnosis   Encounters  Notes  Medications  Labs  Result Review Imaging  Media :23}       History of Present Illness   27 year old female presents to the office today for ongoing evaluation of her chest pain and MONTAGUE.  Patient had COVID at the beginning of September but reports symptoms have been present since then.  Chest pain is located on the right side of her chest only and worsens with movement.  She has associated dyspnea on exertion as well as dizziness and lightheadedness.  Patient does report history of palpitations over the last few years that she describes as irregular beats or fluttering sensation.  She reports palpitations have not worsened since COVID.  She does report intermittent \"racing of her heart\".  History of exercise-induced asthma, currently using as needed inhaler. Patient notes significant fatigue.   Objective     Vital Signs:   Vitals:    09/19/22 1507 09/19/22 1508 09/19/22 1509   BP: 119/64 109/63 107/65   BP Location: Right arm Left arm Left arm   Patient Position: Sitting Standing Sitting   Cuff Size: Adult Adult Adult   Pulse: 68 91 80   Resp:   20   Temp:   97 °F (36.1 °C)   TempSrc:   Temporal   SpO2: 99% 98% 99%   Weight:   79.4 kg (175 lb 2 oz)   Height:   167.6 cm (66\")     Body mass index is 28.27 kg/m².  Physical Exam  Vitals and nursing note reviewed.   Constitutional:       Appearance: Normal appearance.   HENT:      Head: Normocephalic.   Eyes:      Pupils: Pupils are equal, round, and reactive to light.   Cardiovascular:      Rate and Rhythm: Normal rate and regular rhythm.      Pulses: Normal pulses.      Heart sounds: Normal heart sounds. No murmur heard.  Pulmonary:      Effort: Pulmonary effort is normal.      Breath sounds: Normal breath sounds.   Abdominal:      General: Bowel sounds are normal.      Palpations: Abdomen is soft. "   Musculoskeletal:         General: Normal range of motion.      Cervical back: Normal range of motion.      Right lower leg: No edema.      Left lower leg: No edema.   Skin:     General: Skin is warm and dry.      Capillary Refill: Capillary refill takes less than 2 seconds.   Neurological:      Mental Status: She is alert and oriented to person, place, and time.   Psychiatric:         Mood and Affect: Mood normal.         Thought Content: Thought content normal.              Result Review  Data Reviewed:{ Labs  Result Review  Imaging  Med Tab  Media :23}   ECG 12 Lead (09/04/2022 17:00)  CBC & Differential (09/04/2022 17:07)  Comprehensive Metabolic Panel (09/04/2022 17:07)  BNP (09/04/2022 17:07)  Troponin (09/04/2022 17:07)               Assessment and Plan {CC Problem List  Visit Diagnosis  ROS  Review (Popup)  Health Maintenance  Quality  BestPractice  Medications  SmartSets  SnapShot Encounters  Media :23}   1. MONTAGUE (dyspnea on exertion)    - Adult Transthoracic Echo Complete W/ Cont if Necessary Per Protocol; Future    2. Personal history of COVID-19    - Adult Transthoracic Echo Complete W/ Cont if Necessary Per Protocol; Future    3. Palpitations    - Holter Monitor - 72 Hour Up To 15 Days; Future        Follow Up {Instructions Charge Capture  Follow-up Communications :23}   Return in about 4 weeks (around 10/17/2022) for Telemedicine visit, Monitor results.    Patient was given instructions and counseling regarding her condition or for health maintenance advice. Please see specific information pulled into the AVS if appropriate.  Patient was instructed to call the [facility] with any questions, concerns, or worsening symptoms.

## 2022-09-20 ENCOUNTER — PATIENT ROUNDING (BHMG ONLY) (OUTPATIENT)
Dept: CARDIOLOGY | Facility: HOSPITAL | Age: 27
End: 2022-09-20

## 2022-09-20 NOTE — PROGRESS NOTES
September 20, 2022    Hello, may I speak with Eliza Hilliard?    My name is Lea      I am  with [facility]  [address]  [facility] [address] [phone].    Before we get started may I verify your date of birth? 1995    I am calling to officially welcome you to our practice and ask about your recent visit. Is this a good time to talk?Pt unavailable     Tell me about your visit with us. What things went well?  Pt unavailable        We're always looking for ways to make our patients' experiences even better. Do you have recommendations on ways we may improve?  Pt unavailable     Overall were you satisfied with your first visit to our practice?Pt unavailable        I appreciate you taking the time to speak with me today. Is there anything else I can do for you? Pt unavailable       Thank you, and have a great day.

## 2022-09-21 ENCOUNTER — APPOINTMENT (OUTPATIENT)
Dept: CARDIOLOGY | Facility: HOSPITAL | Age: 27
End: 2022-09-21

## 2022-09-29 ENCOUNTER — HOSPITAL ENCOUNTER (OUTPATIENT)
Dept: CARDIOLOGY | Facility: HOSPITAL | Age: 27
Discharge: HOME | End: 2022-09-29
Admitting: NURSE PRACTITIONER

## 2022-09-29 VITALS — BODY MASS INDEX: 28.13 KG/M2 | WEIGHT: 175.04 LBS | HEIGHT: 66 IN

## 2022-09-29 DIAGNOSIS — R06.09 DOE (DYSPNEA ON EXERTION): ICD-10-CM

## 2022-09-29 DIAGNOSIS — Z86.16 PERSONAL HISTORY OF COVID-19: ICD-10-CM

## 2022-09-29 LAB
ASCENDING AORTA: 2.9 CM
BH CV ECHO MEAS - AO MAX PG: 8.4 MMHG
BH CV ECHO MEAS - AO MEAN PG: 4 MMHG
BH CV ECHO MEAS - AO ROOT DIAM: 3 CM
BH CV ECHO MEAS - AO V2 MAX: 145 CM/SEC
BH CV ECHO MEAS - AO V2 VTI: 30.2 CM
BH CV ECHO MEAS - AVA(I,D): 2.07 CM2
BH CV ECHO MEAS - EDV(CUBED): 93 ML
BH CV ECHO MEAS - EDV(MOD-SP2): 81 ML
BH CV ECHO MEAS - EDV(MOD-SP4): 106 ML
BH CV ECHO MEAS - EF(MOD-BP): 67 %
BH CV ECHO MEAS - EF(MOD-SP2): 69.1 %
BH CV ECHO MEAS - EF(MOD-SP4): 65.1 %
BH CV ECHO MEAS - ESV(CUBED): 35.9 ML
BH CV ECHO MEAS - ESV(MOD-SP2): 25 ML
BH CV ECHO MEAS - ESV(MOD-SP4): 37 ML
BH CV ECHO MEAS - FS: 27.2 %
BH CV ECHO MEAS - IVS/LVPW: 0.9 CM
BH CV ECHO MEAS - IVSD: 0.81 CM
BH CV ECHO MEAS - LA DIMENSION: 3 CM
BH CV ECHO MEAS - LAT PEAK E' VEL: 16 CM/SEC
BH CV ECHO MEAS - LV DIASTOLIC VOL/BSA (35-75): 56.1 CM2
BH CV ECHO MEAS - LV MASS(C)D: 125.4 GRAMS
BH CV ECHO MEAS - LV MAX PG: 4.4 MMHG
BH CV ECHO MEAS - LV MEAN PG: 2 MMHG
BH CV ECHO MEAS - LV SYSTOLIC VOL/BSA (12-30): 19.6 CM2
BH CV ECHO MEAS - LV V1 MAX: 105 CM/SEC
BH CV ECHO MEAS - LV V1 VTI: 19.9 CM
BH CV ECHO MEAS - LVIDD: 4.5 CM
BH CV ECHO MEAS - LVIDS: 3.3 CM
BH CV ECHO MEAS - LVOT AREA: 3.1 CM2
BH CV ECHO MEAS - LVOT DIAM: 2 CM
BH CV ECHO MEAS - LVPWD: 0.9 CM
BH CV ECHO MEAS - MED PEAK E' VEL: 11.4 CM/SEC
BH CV ECHO MEAS - MV A MAX VEL: 66.3 CM/SEC
BH CV ECHO MEAS - MV DEC TIME: 0.23 MSEC
BH CV ECHO MEAS - MV E MAX VEL: 93.1 CM/SEC
BH CV ECHO MEAS - MV E/A: 1.4
BH CV ECHO MEAS - MV MAX PG: 5.8 MMHG
BH CV ECHO MEAS - MV MEAN PG: 2 MMHG
BH CV ECHO MEAS - MV V2 VTI: 41.5 CM
BH CV ECHO MEAS - MVA(VTI): 1.51 CM2
BH CV ECHO MEAS - PA ACC SLOPE: 385.5 CM/SEC2
BH CV ECHO MEAS - PA ACC TIME: 0.15 SEC
BH CV ECHO MEAS - PA PR(ACCEL): 11.7 MMHG
BH CV ECHO MEAS - PA V2 MAX: 98.8 CM/SEC
BH CV ECHO MEAS - RAP SYSTOLE: 3 MMHG
BH CV ECHO MEAS - RVSP: 20 MMHG
BH CV ECHO MEAS - SI(MOD-SP2): 29.6 ML/M2
BH CV ECHO MEAS - SI(MOD-SP4): 36.5 ML/M2
BH CV ECHO MEAS - SV(LVOT): 62.5 ML
BH CV ECHO MEAS - SV(MOD-SP2): 56 ML
BH CV ECHO MEAS - SV(MOD-SP4): 69 ML
BH CV ECHO MEAS - TAPSE (>1.6): 2.6 CM
BH CV ECHO MEAS - TR MAX PG: 17 MMHG
BH CV ECHO MEAS - TR MAX VEL: 206 CM/SEC
BH CV ECHO MEASUREMENTS AVERAGE E/E' RATIO: 6.8
BH CV VAS BP RIGHT ARM: NORMAL MMHG
BH CV XLRA - RV BASE: 3.3 CM
BH CV XLRA - RV LENGTH: 8.4 CM
BH CV XLRA - RV MID: 2.8 CM
BH CV XLRA - TDI S': 15.4 CM/SEC
IVRT: 77 MSEC
LEFT ATRIUM VOLUME INDEX: 27 ML/M2
LV EF 2D ECHO EST: 65 %
MAXIMAL PREDICTED HEART RATE: 194 BPM
STRESS TARGET HR: 165 BPM

## 2022-09-29 PROCEDURE — 93306 TTE W/DOPPLER COMPLETE: CPT

## 2022-09-29 PROCEDURE — 93306 TTE W/DOPPLER COMPLETE: CPT | Performed by: INTERNAL MEDICINE

## 2022-09-30 NOTE — PROGRESS NOTES
Your echocardiogram is normal.  Your heart contracts normally.  There are no significant valvular abnormalities noted.    If you have any questions regarding this letter please let me know.    Sincerely yours,        Ayse SHANKAR

## 2022-10-20 ENCOUNTER — TELEMEDICINE (OUTPATIENT)
Dept: CARDIOLOGY | Facility: HOSPITAL | Age: 27
End: 2022-10-20

## 2022-10-20 VITALS — HEIGHT: 66 IN | BODY MASS INDEX: 28.12 KG/M2 | HEART RATE: 80 BPM | WEIGHT: 175 LBS

## 2022-10-20 DIAGNOSIS — R06.09 DOE (DYSPNEA ON EXERTION): ICD-10-CM

## 2022-10-20 DIAGNOSIS — Z86.16 PERSONAL HISTORY OF COVID-19: ICD-10-CM

## 2022-10-20 DIAGNOSIS — R00.2 PALPITATIONS: Primary | ICD-10-CM

## 2022-10-20 PROCEDURE — 99213 OFFICE O/P EST LOW 20 MIN: CPT | Performed by: NURSE PRACTITIONER

## 2022-10-20 NOTE — PROGRESS NOTES
"Chief Complaint  Follow-up (Hay )    Subjective    History of Present Illness {CC  Problem List  Visit  Diagnosis   Encounters  Notes  Medications  Labs  Result Review Imaging  Media :23}     You have chosen to receive care through the use of telemedicine. Telemedicine enables health care providers at different locations to provide safe, effective, and convenient care through the use of technology. As with any health care service, there are risks associated with the use of telemedicine, including equipment failure, poor connections, and  issues.    • Do you understand the risks and benefits of telemedicine as I have explained them to you? Yes  • Have your questions regarding telemedicine been answered? Yes  • Do you consent to the use of telemedicine in your medical care today? Yes    History of Present Illness   27 year old female presents to the office today as a telehealth visit for ongoing evaluation of her HAY and palpitations. Had COVID in September and reports that Hay and chest tightness is improving slowly. She reports that she is exercising more regularly and still notes that she is having intermittent palpitations.  Currently denies dizziness, presyncope, syncope, orthopnea, PND or pedal edema.  Vital Signs:   Vitals:    10/20/22 0846   Pulse: 80   Weight: 79.4 kg (175 lb)   Height: 167.6 cm (65.98\")     Body mass index is 28.26 kg/m².  Physical Exam  Vitals and nursing note reviewed.   Constitutional:       Appearance: Normal appearance.   HENT:      Head: Normocephalic.   Pulmonary:      Effort: Pulmonary effort is normal.   Neurological:      Mental Status: She is alert and oriented to person, place, and time.   Psychiatric:         Mood and Affect: Mood normal.         Behavior: Behavior normal.         Thought Content: Thought content normal.              Result Review  Data Reviewed:{ Labs  Result Review  Imaging  Med Tab  Media :23}      Echo 9/29/2022: EF 65% with " cardiac valves anatomically and functionally normal  Extended Holter worn for 13 days 13 hours and 37 minutes showed an average heart rate of 84 bpm PAC/PVC burden less than 1%.  Patient did have 1 run of SVT on day 10 of the monitor and she notes she was running at that time.  Patient reports she was asymptomatic during that episode of brief SVT         Assessment and Plan {CC Problem List  Visit Diagnosis  ROS  Review (Popup)  Health Maintenance  Quality  BestPractice  Medications  SmartSets  SnapShot Encounters  Media :23}   1. Palpitations  Low burden on recent extended Holter  2. [place] (dyspnea on exertion)  Normal echo  Suspect this is sequela of COVID  3. Personal history of COVID-19  Improving slowly with dyspnea and chest tightness      Follow Up {Instructions Charge Capture  Follow-up Communications :23}   Return if symptoms worsen or fail to improve.    Patient was given instructions and counseling regarding her condition or for health maintenance advice. Please see specific information pulled into the AVS if appropriate.  Patient was instructed to call the [facility] with any questions, concerns, or worsening symptoms.

## 2022-10-24 PROCEDURE — 93248 EXT ECG>7D<15D REV&INTERPJ: CPT | Performed by: INTERNAL MEDICINE

## 2023-02-14 PROCEDURE — 87086 URINE CULTURE/COLONY COUNT: CPT | Performed by: NURSE PRACTITIONER

## 2023-02-14 PROCEDURE — 87088 URINE BACTERIA CULTURE: CPT | Performed by: NURSE PRACTITIONER

## 2023-02-14 PROCEDURE — 87186 SC STD MICRODIL/AGAR DIL: CPT | Performed by: NURSE PRACTITIONER

## 2024-05-20 RX ORDER — HYDROXYCHLOROQUINE SULFATE 200 MG/1
200 TABLET, FILM COATED ORAL 2 TIMES DAILY
Qty: 60 TABLET | Refills: 2 | Status: SHIPPED | OUTPATIENT
Start: 2024-05-20

## 2024-07-30 PROBLEM — Z79.899 ENCOUNTER FOR LONG-TERM (CURRENT) USE OF HIGH-RISK MEDICATION: Status: ACTIVE | Noted: 2024-07-30

## 2024-07-30 PROBLEM — A69.20 LYME DISEASE: Status: ACTIVE | Noted: 2024-07-30

## 2024-07-30 PROBLEM — M05.9 RHEUMATOID ARTHRITIS WITH POSITIVE RHEUMATOID FACTOR: Status: ACTIVE | Noted: 2024-07-30

## 2024-07-30 NOTE — ASSESSMENT & PLAN NOTE
*Vitamin D 11.2 1/2024    We prescribed her a course of ergocalciferol to take weekly for 8 weeks which she completed in the spring.  Recommend OTC vitamin D3 2000 IU daily for now  Recheck level today

## 2024-07-30 NOTE — ASSESSMENT & PLAN NOTE
* Hydroxychloroquine 200 mg PO BID For RA  * 1st prescribed 2/8/24.    On this medication the patient needs to have an eye exam at least once/year to monitor for toxicity. No eye complaints today.

## 2024-07-30 NOTE — ASSESSMENT & PLAN NOTE
*11/29/23: Pregnancy test negative, ESR 24 (<20 normal), CRP normal, CBC was fine, EBV IgM Normal, EBV IgG high, CMV not detected, Parvovirus IgM Normal, Parvovirus IgG High, JIN negative, Uric acid 3.3  * 11/14/23: ESR 21 (<20), RF negative  * 1/25/24: IgG RF+, IgM RF+   * Medications/treatments/interventions tried include: Tylenol, fluoxetine, ibuprofen, diclofenac, hydroxychloroquine, doxycycline.    1. She started hydroxychlorquine 2/2024.  She reports significant improvement since starting the medication.  2. Check labs today  3. Follow up in 4-6 months

## 2024-07-30 NOTE — PROGRESS NOTES
Office Visit       Date: 08/02/2024   Patient Name: Eliza Hilliard  MRN: [id number]  YOB: 1995    Referring Physician: No ref. provider found     Chief Complaint: No chief complaint on file.      History of Present Illness: Eliza Hilliard is a 28 y.o. female who is here today for follow-up of rheumatoid arthritis.    She established care here at the [facility] as of 12/21/23. Labs done 1/25/24 showed her to have a + test for Lyme disease. She was prescribed a 28 day course of doxycycline which she has completed. She was also noted to have a + IgM and IgG RF tests. We prescribed her hydroxychloroquine for RA. Her vitamin D level was low so we prescribed 8 weeks of ergocalciferol which she completed.    She reports significant improvement in her joint symptoms since starting Plaquenil.  Medications well-tolerated.    No recent serious injuries or infections. No fever.   In the interim she did have an episode of headache and dysarthria.  She went to the ER.  She had a negative head CT.  She will be seeing neurology soon.    Today she rates her pain as 0/10 in severity. She denies morning stiffness. No red or hot joints. No joint swelling. No muscle pain or weakness. No back or neck problems.       Subjective   Review of systems:  Review of Systems   All other systems reviewed and are negative.      Past Medical History:   Past Medical History:   Diagnosis Date    Anxiety     Asthma     as a child    Depression     Dizziness     has had frequently    Headache     Irregular heartbeat     sometimes has chest pain also    Lyme disease     RA (rheumatoid arthritis)     Wears glasses        Past Surgical History:   Past Surgical History:   Procedure Laterality Date    NO PAST SURGERIES         Family History:   Family History   Problem Relation Age of Onset    No Known Problems Mother     No Known Problems Father     No Known Problems Maternal Grandmother     No Known  "Problems Maternal Grandfather     No Known Problems Paternal Grandmother     Hypertension Paternal Grandfather     Breast cancer Maternal Aunt 50       Social History:   Social History     Socioeconomic History    Marital status:     Number of children: 0    Years of education: 12+   Tobacco Use    Smoking status: Never     Passive exposure: Never    Smokeless tobacco: Never   Vaping Use    Vaping status: Never Used   Substance and Sexual Activity    Alcohol use: Yes     Comment: Rarely    Drug use: No    Sexual activity: Never       Medications:   Current Outpatient Medications:     hydroxychloroquine (PLAQUENIL) 200 MG tablet, TAKE 1 TABLET BY MOUTH TWICE A DAY, Disp: 60 tablet, Rfl: 2    Allergies: No Known Allergies    I reviewed the patient's chief complaint, history of present illness, review of systems, past medical history, surgical history, family history, social history, medications and allergy list.     Objective    Vital Signs:   Vitals:    08/02/24 0914   BP: 122/70   BP Location: Left arm   Pulse: 69   Temp: 97.9 °F (36.6 °C)   Weight: 89.8 kg (198 lb)   Height: 167.6 cm (66\")   PainSc:   2     Body mass index is 31.96 kg/m².   BMI is >= 25 and <30. (Overweight) The following options were offered after discussion;: referral to primary care       Physical Exam:  Physical Exam  Constitutional:       Appearance: Normal appearance.   HENT:      Head: Normocephalic and atraumatic.   Eyes:      Conjunctiva/sclera: Conjunctivae normal.      Pupils: Pupils are equal, round, and reactive to light.   Cardiovascular:      Rate and Rhythm: Normal rate and regular rhythm.      Heart sounds: Normal heart sounds.   Pulmonary:      Effort: Pulmonary effort is normal.      Breath sounds: Normal breath sounds.   Musculoskeletal:         General: Normal range of motion.      Cervical back: Normal range of motion.   Skin:     General: Skin is warm and dry.   Neurological:      General: No focal deficit present.     "  Mental Status: She is alert and oriented to person, place, and time.   Psychiatric:         Mood and Affect: Mood normal.         Behavior: Behavior normal.         Thought Content: Thought content normal.         Judgment: Judgment normal.            Metrics  SOUSA-28 (ESR): --  SOUSA-28 (CRP): --  Tender (SOUSA-28): 0 / 28   Swollen (SOUSA-28): 0 / 28     This Exam  Provider Global: 0 / 100  Patient Global: 0 / 100  ESR: --  CRP: --      Results Review:   Imaging Results (Last 24 Hours)       ** No results found for the last 24 hours. **            Assessment / Plan    Assessment/Plan:   Diagnoses and all orders for this visit:    1. Rheumatoid arthritis with positive rheumatoid factor, involving unspecified site (Primary)  Assessment & Plan:  *11/29/23: Pregnancy test negative, ESR 24 (<20 normal), CRP normal, CBC was fine, EBV IgM Normal, EBV IgG high, CMV not detected, Parvovirus IgM Normal, Parvovirus IgG High, IJN negative, Uric acid 3.3  * 11/14/23: ESR 21 (<20), RF negative  * 1/25/24: IgG RF+, IgM RF+   * Medications/treatments/interventions tried include: Tylenol, fluoxetine, ibuprofen, diclofenac, hydroxychloroquine, doxycycline.    1. She started hydroxychlorquine 2/2024.  She reports significant improvement since starting the medication.  2. Check labs today  3. Follow up in 4-6 months     Orders:  -     Comprehensive Metabolic Panel; Future  -     C-reactive Protein; Future  -     Sedimentation Rate; Future  -     CBC Auto Differential; Future    2. Vitamin D deficiency  Assessment & Plan:  *Vitamin D 11.2 1/2024    We prescribed her a course of ergocalciferol to take weekly for 8 weeks which she completed in the spring.  Recommend OTC vitamin D3 2000 IU daily for now  Recheck level today     Orders:  -     Vitamin D,25-Hydroxy; Future    3. Encounter for long-term (current) use of high-risk medication  Assessment & Plan:  * Hydroxychloroquine 200 mg PO BID For RA  * 1st prescribed 2/8/24.    On this  medication the patient needs to have an eye exam at least once/year to monitor for toxicity. No eye complaints today.    Orders:  -     Comprehensive Metabolic Panel; Future  -     C-reactive Protein; Future  -     Sedimentation Rate; Future  -     CBC Auto Differential; Future    4. Lyme disease  Assessment & Plan:  We gave her a 28 day course of doxycycline, which she has completed          Follow Up:   Return 4-6 months, for Dr. Fernando.        VONNIE Gastelum  [facility]

## 2024-08-02 ENCOUNTER — OFFICE VISIT (OUTPATIENT)
Age: 29
End: 2024-08-02
Payer: COMMERCIAL

## 2024-08-02 ENCOUNTER — LAB (OUTPATIENT)
Facility: HOSPITAL | Age: 29
End: 2024-08-02
Payer: COMMERCIAL

## 2024-08-02 VITALS
TEMPERATURE: 97.9 F | DIASTOLIC BLOOD PRESSURE: 70 MMHG | BODY MASS INDEX: 31.82 KG/M2 | SYSTOLIC BLOOD PRESSURE: 122 MMHG | HEART RATE: 69 BPM | WEIGHT: 198 LBS | HEIGHT: 66 IN

## 2024-08-02 DIAGNOSIS — E55.9 VITAMIN D DEFICIENCY: ICD-10-CM

## 2024-08-02 DIAGNOSIS — M05.9 RHEUMATOID ARTHRITIS WITH POSITIVE RHEUMATOID FACTOR, INVOLVING UNSPECIFIED SITE: ICD-10-CM

## 2024-08-02 DIAGNOSIS — Z79.899 ENCOUNTER FOR LONG-TERM (CURRENT) USE OF HIGH-RISK MEDICATION: ICD-10-CM

## 2024-08-02 DIAGNOSIS — M05.9 RHEUMATOID ARTHRITIS WITH POSITIVE RHEUMATOID FACTOR, INVOLVING UNSPECIFIED SITE: Primary | ICD-10-CM

## 2024-08-02 DIAGNOSIS — A69.20 LYME DISEASE: ICD-10-CM

## 2024-08-02 PROBLEM — F41.9 ANXIETY: Status: ACTIVE | Noted: 2024-08-02

## 2024-08-02 PROBLEM — J45.990 EXERCISE-INDUCED ASTHMA: Status: ACTIVE | Noted: 2023-05-03

## 2024-08-02 LAB
25(OH)D3 SERPL-MCNC: 23.6 NG/ML (ref 30–100)
ALBUMIN SERPL-MCNC: 4.4 G/DL (ref 3.5–5.2)
ALBUMIN/GLOB SERPL: 1.2 G/DL
ALP SERPL-CCNC: 81 U/L (ref 39–117)
ALT SERPL W P-5'-P-CCNC: 23 U/L (ref 1–33)
ANION GAP SERPL CALCULATED.3IONS-SCNC: 10 MMOL/L (ref 5–15)
AST SERPL-CCNC: 28 U/L (ref 1–32)
BASOPHILS # BLD MANUAL: 0 10*3/MM3 (ref 0–0.2)
BASOPHILS NFR BLD MANUAL: 0 % (ref 0–1.5)
BILIRUB SERPL-MCNC: 0.4 MG/DL (ref 0–1.2)
BUN SERPL-MCNC: 7 MG/DL (ref 6–20)
BUN/CREAT SERPL: 7.4 (ref 7–25)
CALCIUM SPEC-SCNC: 9.6 MG/DL (ref 8.6–10.5)
CHLORIDE SERPL-SCNC: 101 MMOL/L (ref 98–107)
CO2 SERPL-SCNC: 24 MMOL/L (ref 22–29)
CREAT SERPL-MCNC: 0.94 MG/DL (ref 0.57–1)
CRP SERPL-MCNC: <0.3 MG/DL (ref 0–0.5)
DEPRECATED RDW RBC AUTO: 43.2 FL (ref 37–54)
EGFRCR SERPLBLD CKD-EPI 2021: 85.5 ML/MIN/1.73
EOSINOPHIL # BLD MANUAL: 0.17 10*3/MM3 (ref 0–0.4)
EOSINOPHIL NFR BLD MANUAL: 4.1 % (ref 0.3–6.2)
ERYTHROCYTE [DISTWIDTH] IN BLOOD BY AUTOMATED COUNT: 12.5 % (ref 12.3–15.4)
ERYTHROCYTE [SEDIMENTATION RATE] IN BLOOD: 29 MM/HR (ref 0–20)
GLOBULIN UR ELPH-MCNC: 3.8 GM/DL
GLUCOSE SERPL-MCNC: 89 MG/DL (ref 65–99)
HCT VFR BLD AUTO: 36.8 % (ref 34–46.6)
HGB BLD-MCNC: 12.6 G/DL (ref 12–15.9)
LYMPHOCYTES # BLD MANUAL: 2.18 10*3/MM3 (ref 0.7–3.1)
LYMPHOCYTES NFR BLD MANUAL: 10.2 % (ref 5–12)
MCH RBC QN AUTO: 32.5 PG (ref 26.6–33)
MCHC RBC AUTO-ENTMCNC: 34.2 G/DL (ref 31.5–35.7)
MCV RBC AUTO: 94.8 FL (ref 79–97)
MONOCYTES # BLD: 0.43 10*3/MM3 (ref 0.1–0.9)
NEUTROPHILS # BLD AUTO: 1.42 10*3/MM3 (ref 1.7–7)
NEUTROPHILS NFR BLD MANUAL: 33.7 % (ref 42.7–76)
NRBC BLD AUTO-RTO: 0 /100 WBC (ref 0–0.2)
PLAT MORPH BLD: NORMAL
PLATELET # BLD AUTO: 322 10*3/MM3 (ref 140–450)
PMV BLD AUTO: 10.3 FL (ref 6–12)
POTASSIUM SERPL-SCNC: 4.5 MMOL/L (ref 3.5–5.2)
PROT SERPL-MCNC: 8.2 G/DL (ref 6–8.5)
RBC # BLD AUTO: 3.88 10*6/MM3 (ref 3.77–5.28)
RBC MORPH BLD: NORMAL
SODIUM SERPL-SCNC: 135 MMOL/L (ref 136–145)
VARIANT LYMPHS NFR BLD MANUAL: 1 % (ref 0–5)
VARIANT LYMPHS NFR BLD MANUAL: 51 % (ref 19.6–45.3)
WBC MORPH BLD: NORMAL
WBC NRBC COR # BLD AUTO: 4.2 10*3/MM3 (ref 3.4–10.8)

## 2024-08-02 PROCEDURE — 85007 BL SMEAR W/DIFF WBC COUNT: CPT

## 2024-08-02 PROCEDURE — 36415 COLL VENOUS BLD VENIPUNCTURE: CPT

## 2024-08-02 PROCEDURE — 86140 C-REACTIVE PROTEIN: CPT

## 2024-08-02 PROCEDURE — 82306 VITAMIN D 25 HYDROXY: CPT

## 2024-08-02 PROCEDURE — 80053 COMPREHEN METABOLIC PANEL: CPT

## 2024-08-02 PROCEDURE — 85652 RBC SED RATE AUTOMATED: CPT

## 2024-08-02 PROCEDURE — 85025 COMPLETE CBC W/AUTO DIFF WBC: CPT

## 2024-08-02 RX ORDER — HYDROXYCHLOROQUINE SULFATE 200 MG/1
200 TABLET, FILM COATED ORAL 2 TIMES DAILY
Qty: 60 TABLET | Refills: 2 | Status: CANCELLED | OUTPATIENT
Start: 2024-08-02

## 2024-08-05 DIAGNOSIS — R79.89 LOW VITAMIN D LEVEL: Primary | ICD-10-CM

## 2024-08-05 RX ORDER — ERGOCALCIFEROL 1.25 MG/1
50000 CAPSULE ORAL WEEKLY
Qty: 12 CAPSULE | Refills: 0 | Status: SHIPPED | OUTPATIENT
Start: 2024-08-05

## 2024-09-03 ENCOUNTER — TELEPHONE (OUTPATIENT)
Age: 29
End: 2024-09-03
Payer: COMMERCIAL

## 2024-09-03 RX ORDER — HYDROXYCHLOROQUINE SULFATE 200 MG/1
200 TABLET, FILM COATED ORAL 2 TIMES DAILY
Qty: 60 TABLET | Refills: 2 | Status: SHIPPED | OUTPATIENT
Start: 2024-09-03

## 2024-09-03 NOTE — ADDENDUM NOTE
Addended by: WILBER HUNTER on: 9/3/2024 02:07 PM     Modules accepted: Orders     Problem: Risk for injury  Goal: Patient and fetus will be free of preventable injury/complications    Intervention: Monitor Fetal well being  Will obtain reactive NST every shift      Problem: Risk for Fluid Imbalance  Goal: Promotion of Fluid Balance    Intervention: Assess amount of vaginal bloody show; observe for excess blood loss  Pt to notify RN if vaginal bleeding occurs

## 2024-09-26 ENCOUNTER — TRANSCRIBE ORDERS (OUTPATIENT)
Dept: ADMINISTRATIVE | Facility: HOSPITAL | Age: 29
End: 2024-09-26
Payer: COMMERCIAL

## 2024-09-26 DIAGNOSIS — R10.30 LOWER ABDOMINAL PAIN: Primary | ICD-10-CM

## 2024-10-08 ENCOUNTER — HOSPITAL ENCOUNTER (OUTPATIENT)
Dept: ULTRASOUND IMAGING | Facility: HOSPITAL | Age: 29
Discharge: HOME | End: 2024-10-08
Admitting: FAMILY MEDICINE
Payer: COMMERCIAL

## 2024-10-08 DIAGNOSIS — R10.30 LOWER ABDOMINAL PAIN: ICD-10-CM

## 2024-10-08 PROCEDURE — 76830 TRANSVAGINAL US NON-OB: CPT

## 2024-10-15 DIAGNOSIS — D21.9 FIBROIDS: Primary | ICD-10-CM

## 2024-10-22 DIAGNOSIS — R79.89 LOW VITAMIN D LEVEL: ICD-10-CM

## 2024-10-22 RX ORDER — ERGOCALCIFEROL 1.25 MG/1
50000 CAPSULE, LIQUID FILLED ORAL
Qty: 12 CAPSULE | Refills: 0 | Status: SHIPPED | OUTPATIENT
Start: 2024-10-22

## 2024-11-15 ENCOUNTER — APPOINTMENT (OUTPATIENT)
Facility: HOSPITAL | Age: 29
End: 2024-11-15
Payer: COMMERCIAL

## 2024-11-15 ENCOUNTER — HOSPITAL ENCOUNTER (EMERGENCY)
Facility: HOSPITAL | Age: 29
Discharge: HOME | End: 2024-11-15
Attending: EMERGENCY MEDICINE
Payer: COMMERCIAL

## 2024-11-15 VITALS
BODY MASS INDEX: 32.3 KG/M2 | SYSTOLIC BLOOD PRESSURE: 123 MMHG | OXYGEN SATURATION: 100 % | DIASTOLIC BLOOD PRESSURE: 71 MMHG | HEIGHT: 66 IN | WEIGHT: 201 LBS | HEART RATE: 72 BPM | RESPIRATION RATE: 20 BRPM | TEMPERATURE: 98.4 F

## 2024-11-15 DIAGNOSIS — R07.9 CHEST PAIN, UNSPECIFIED TYPE: Primary | ICD-10-CM

## 2024-11-15 LAB
ALBUMIN SERPL-MCNC: 4.4 G/DL (ref 3.5–5.2)
ALBUMIN/GLOB SERPL: 1.3 G/DL
ALP SERPL-CCNC: 73 U/L (ref 39–117)
ALT SERPL W P-5'-P-CCNC: 16 U/L (ref 1–33)
ANION GAP SERPL CALCULATED.3IONS-SCNC: 10.2 MMOL/L (ref 5–15)
AST SERPL-CCNC: 28 U/L (ref 1–32)
BASOPHILS # BLD AUTO: 0.04 10*3/MM3 (ref 0–0.2)
BASOPHILS NFR BLD AUTO: 0.6 % (ref 0–1.5)
BILIRUB SERPL-MCNC: 0.4 MG/DL (ref 0–1.2)
BUN SERPL-MCNC: 7 MG/DL (ref 6–20)
BUN/CREAT SERPL: 9.2 (ref 7–25)
CALCIUM SPEC-SCNC: 9.2 MG/DL (ref 8.6–10.5)
CHLORIDE SERPL-SCNC: 103 MMOL/L (ref 98–107)
CO2 SERPL-SCNC: 22.8 MMOL/L (ref 22–29)
CREAT SERPL-MCNC: 0.76 MG/DL (ref 0.57–1)
D DIMER PPP FEU-MCNC: <0.27 MCGFEU/ML (ref 0–0.5)
DEPRECATED RDW RBC AUTO: 42.1 FL (ref 37–54)
EGFRCR SERPLBLD CKD-EPI 2021: 109.6 ML/MIN/1.73
EOSINOPHIL # BLD AUTO: 0.05 10*3/MM3 (ref 0–0.4)
EOSINOPHIL NFR BLD AUTO: 0.8 % (ref 0.3–6.2)
ERYTHROCYTE [DISTWIDTH] IN BLOOD BY AUTOMATED COUNT: 12 % (ref 12.3–15.4)
GEN 5 2HR TROPONIN T REFLEX: <6 NG/L
GLOBULIN UR ELPH-MCNC: 3.4 GM/DL
GLUCOSE SERPL-MCNC: 91 MG/DL (ref 65–99)
HCT VFR BLD AUTO: 36.3 % (ref 34–46.6)
HGB BLD-MCNC: 12.3 G/DL (ref 12–15.9)
HOLD SPECIMEN: NORMAL
IMM GRANULOCYTES # BLD AUTO: 0 10*3/MM3 (ref 0–0.05)
IMM GRANULOCYTES NFR BLD AUTO: 0 % (ref 0–0.5)
LIPASE SERPL-CCNC: 53 U/L (ref 13–60)
LYMPHOCYTES # BLD AUTO: 3.38 10*3/MM3 (ref 0.7–3.1)
LYMPHOCYTES NFR BLD AUTO: 51.8 % (ref 19.6–45.3)
MCH RBC QN AUTO: 31.6 PG (ref 26.6–33)
MCHC RBC AUTO-ENTMCNC: 33.9 G/DL (ref 31.5–35.7)
MCV RBC AUTO: 93.3 FL (ref 79–97)
MONOCYTES # BLD AUTO: 0.83 10*3/MM3 (ref 0.1–0.9)
MONOCYTES NFR BLD AUTO: 12.7 % (ref 5–12)
NEUTROPHILS NFR BLD AUTO: 2.23 10*3/MM3 (ref 1.7–7)
NEUTROPHILS NFR BLD AUTO: 34.1 % (ref 42.7–76)
NT-PROBNP SERPL-MCNC: <36 PG/ML (ref 0–450)
PLATELET # BLD AUTO: 331 10*3/MM3 (ref 140–450)
PMV BLD AUTO: 9.8 FL (ref 6–12)
POTASSIUM SERPL-SCNC: 3.8 MMOL/L (ref 3.5–5.2)
PROT SERPL-MCNC: 7.8 G/DL (ref 6–8.5)
RBC # BLD AUTO: 3.89 10*6/MM3 (ref 3.77–5.28)
SODIUM SERPL-SCNC: 136 MMOL/L (ref 136–145)
TROPONIN T DELTA: NORMAL
TROPONIN T SERPL HS-MCNC: <6 NG/L
WBC NRBC COR # BLD AUTO: 6.53 10*3/MM3 (ref 3.4–10.8)
WHOLE BLOOD HOLD COAG: NORMAL
WHOLE BLOOD HOLD SPECIMEN: NORMAL

## 2024-11-15 PROCEDURE — 93005 ELECTROCARDIOGRAM TRACING: CPT | Performed by: EMERGENCY MEDICINE

## 2024-11-15 PROCEDURE — 25010000002 KETOROLAC TROMETHAMINE PER 15 MG: Performed by: PHYSICIAN ASSISTANT

## 2024-11-15 PROCEDURE — 36415 COLL VENOUS BLD VENIPUNCTURE: CPT

## 2024-11-15 PROCEDURE — 71045 X-RAY EXAM CHEST 1 VIEW: CPT

## 2024-11-15 PROCEDURE — 85379 FIBRIN DEGRADATION QUANT: CPT | Performed by: PHYSICIAN ASSISTANT

## 2024-11-15 PROCEDURE — 85025 COMPLETE CBC W/AUTO DIFF WBC: CPT | Performed by: EMERGENCY MEDICINE

## 2024-11-15 PROCEDURE — 84484 ASSAY OF TROPONIN QUANT: CPT | Performed by: EMERGENCY MEDICINE

## 2024-11-15 PROCEDURE — 80053 COMPREHEN METABOLIC PANEL: CPT | Performed by: EMERGENCY MEDICINE

## 2024-11-15 PROCEDURE — 83880 ASSAY OF NATRIURETIC PEPTIDE: CPT | Performed by: EMERGENCY MEDICINE

## 2024-11-15 PROCEDURE — 96374 THER/PROPH/DIAG INJ IV PUSH: CPT

## 2024-11-15 PROCEDURE — 99284 EMERGENCY DEPT VISIT MOD MDM: CPT

## 2024-11-15 PROCEDURE — 83690 ASSAY OF LIPASE: CPT | Performed by: EMERGENCY MEDICINE

## 2024-11-15 RX ORDER — ASPIRIN 81 MG/1
324 TABLET, CHEWABLE ORAL ONCE
Status: DISCONTINUED | OUTPATIENT
Start: 2024-11-15 | End: 2024-11-16 | Stop reason: HOSPADM

## 2024-11-15 RX ORDER — SODIUM CHLORIDE 0.9 % (FLUSH) 0.9 %
10 SYRINGE (ML) INJECTION AS NEEDED
Status: DISCONTINUED | OUTPATIENT
Start: 2024-11-15 | End: 2024-11-16 | Stop reason: HOSPADM

## 2024-11-15 RX ORDER — KETOROLAC TROMETHAMINE 15 MG/ML
15 INJECTION, SOLUTION INTRAMUSCULAR; INTRAVENOUS ONCE
Status: COMPLETED | OUTPATIENT
Start: 2024-11-15 | End: 2024-11-15

## 2024-11-15 RX ADMIN — KETOROLAC TROMETHAMINE 15 MG: 15 INJECTION, SOLUTION INTRAMUSCULAR; INTRAVENOUS at 20:22

## 2024-11-16 NOTE — FSED PROVIDER NOTE
Subjective  History of Present Illness:    This is a 29 year old female who presents with complaints of palpitations and chest tightness for the past week. Today, states that her symptoms of gotten worse with longer episodes.  Episodes today lasted 2 hours.  She had something similar in 2022 and had fairly extensive workup including Holter monitor at that time with no findings noted.  She has a history of RA, currently not on any Biologics.  Patient takes no medications daily.  No nausea or vomiting, no syncope, does endorse chest tightness, no shortness of breath.      Nurses Notes reviewed and agree, including vitals, allergies, social history and prior medical history.     REVIEW OF SYSTEMS: All systems reviewed and not pertinent unless noted.  Review of Systems    Past Medical History:   Diagnosis Date    Anxiety     Asthma     as a child    Depression     Dizziness     has had frequently    Headache     Irregular heartbeat     sometimes has chest pain also    Lyme disease     RA (rheumatoid arthritis)     Wears glasses        Allergies:    Dust mite extract      Past Surgical History:   Procedure Laterality Date    NO PAST SURGERIES           Social History     Socioeconomic History    Marital status:     Number of children: 0    Years of education: 12+   Tobacco Use    Smoking status: Never     Passive exposure: Never    Smokeless tobacco: Never   Vaping Use    Vaping status: Never Used   Substance and Sexual Activity    Alcohol use: Yes     Comment: Rarely    Drug use: No    Sexual activity: Never         Family History   Problem Relation Age of Onset    No Known Problems Mother     No Known Problems Father     No Known Problems Maternal Grandmother     No Known Problems Maternal Grandfather     No Known Problems Paternal Grandmother     Hypertension Paternal Grandfather     Breast cancer Maternal Aunt 50       Objective  Physical Exam:  /71   Pulse 72   Temp 98.4 °F (36.9 °C) (Oral)   Resp 20  "  Ht 167.6 cm (66\")   Wt 91.2 kg (201 lb)   LMP 10/01/2024   SpO2 100%   BMI 32.44 kg/m²      Physical Exam  Vitals and nursing note reviewed.   Constitutional:       Appearance: Normal appearance.   Cardiovascular:      Rate and Rhythm: Normal rate and regular rhythm.      Pulses: Normal pulses.   Pulmonary:      Effort: Pulmonary effort is normal.      Breath sounds: Normal breath sounds.   Abdominal:      General: Abdomen is flat.      Palpations: Abdomen is soft.   Skin:     General: Skin is warm.   Neurological:      Mental Status: She is alert.         ECG 12 Lead ED Triage Standing Order; Chest Pain      Date/Time: 11/15/2024 9:08 PM    Performed by: Murray Miller MD  Authorized by: Murray Miller MD  Interpreted by ED physician  Rhythm: sinus rhythm  Rate: normal  BPM: 97  QRS axis: normal  Conduction: conduction normal  ST Segments: ST segments normal  T Waves: T waves normal  Other: no other findings  Clinical impression: normal ECG        ED Course:         Lab Results (last 24 hours)       Procedure Component Value Units Date/Time    High Sensitivity Troponin T [[id number]]  (Normal) Collected: 11/15/24 1949    Specimen: Blood Updated: 11/15/24 2011     HS Troponin T <6 ng/L     CBC & Differential [[id number]]  (Abnormal) Collected: 11/15/24 1949    Specimen: Blood Updated: 11/15/24 1957    Narrative:      The following orders were created for panel order CBC & Differential.  Procedure                               Abnormality         Status                     ---------                               -----------         ------                     CBC Auto Differential[[id number]]        Abnormal            Final result                 Please view results for these tests on the individual orders.    Comprehensive Metabolic Panel [[id number]] Collected: 11/15/24 1949    Specimen: Blood Updated: 11/15/24 2014     Glucose 91 mg/dL      BUN 7 mg/dL      Creatinine 0.76 mg/dL      Sodium 136 mmol/L      " Potassium 3.8 mmol/L      Chloride 103 mmol/L      CO2 22.8 mmol/L      Calcium 9.2 mg/dL      Total Protein 7.8 g/dL      Albumin 4.4 g/dL      ALT (SGPT) 16 U/L      AST (SGOT) 28 U/L      Alkaline Phosphatase 73 U/L      Total Bilirubin 0.4 mg/dL      Globulin 3.4 gm/dL      A/G Ratio 1.3 g/dL      BUN/Creatinine Ratio 9.2     Anion Gap 10.2 mmol/L      eGFR 109.6 mL/min/1.73     Narrative:      GFR Normal >60  Chronic Kidney Disease <60  Kidney Failure <15      Lipase [[id number]]  (Normal) Collected: 11/15/24 1949    Specimen: Blood Updated: 11/15/24 2014     Lipase 53 U/L     BNP [[id number]]  (Normal) Collected: 11/15/24 1949    Specimen: Blood Updated: 11/15/24 2011     proBNP <36.0 pg/mL     Narrative:      This assay is used as an aid in the diagnosis of individuals suspected of having heart failure. It can be used as an aid in the diagnosis of acute decompensated heart failure (ADHF) in patients presenting with signs and symptoms of ADHF to the emergency department (ED). In addition, NT-proBNP of <300 pg/mL indicates ADHF is not likely.    Age Range Result Interpretation  NT-proBNP Concentration (pg/mL:      <50             Positive            >450                   Gray                 300-450                    Negative             <300    50-75           Positive            >900                  Gray                300-900                  Negative            <300      >75             Positive            >1800                  Gray                300-1800                  Negative            <300    CBC Auto Differential [[id number]]  (Abnormal) Collected: 11/15/24 1949    Specimen: Blood Updated: 11/15/24 1957     WBC 6.53 10*3/mm3      RBC 3.89 10*6/mm3      Hemoglobin 12.3 g/dL      Hematocrit 36.3 %      MCV 93.3 fL      MCH 31.6 pg      MCHC 33.9 g/dL      RDW 12.0 %      RDW-SD 42.1 fl      MPV 9.8 fL      Platelets 331 10*3/mm3      Neutrophil % 34.1 %      Lymphocyte % 51.8 %      Monocyte % 12.7  "%      Eosinophil % 0.8 %      Basophil % 0.6 %      Immature Grans % 0.0 %      Neutrophils, Absolute 2.23 10*3/mm3      Lymphocytes, Absolute 3.38 10*3/mm3      Monocytes, Absolute 0.83 10*3/mm3      Eosinophils, Absolute 0.05 10*3/mm3      Basophils, Absolute 0.04 10*3/mm3      Immature Grans, Absolute 0.00 10*3/mm3     D-dimer, Quantitative [[id number]]  (Normal) Collected: 11/15/24 1949    Specimen: Blood Updated: 11/15/24 2030     D-Dimer, Quantitative <0.27 MCGFEU/mL     Narrative:      According to the assay 's published package insert, a normal (<0.50 MCGFEU/mL) D-dimer result in conjunction with a non-high clinical probability assessment, excludes deep vein thrombosis (DVT) and pulmonary embolism (PE) with high sensitivity.    D-dimer values increase with age and this can make VTE exclusion of an older population difficult. To address this, the American College of [employer], based on best available evidence and recent guidelines, recommends that clinicians use age-adjusted D-dimer thresholds in patients greater than 50 years of age with: a) a low probability of PE who do not meet all Pulmonary Embolism Rule Out Criteria, or b) in those with intermediate probability of PE.   The formula for an age-adjusted D-dimer cut-off is \"age/100\".  For example, a 60 year old patient would have an age-adjusted cut-off of 0.60 MCGFEU/mL and an 80 year old 0.80 MCGFEU/mL.    High Sensitivity Troponin T 2Hr [[id number]] Collected: 11/15/24 2150    Specimen: Blood Updated: 11/15/24 2210     HS Troponin T <6 ng/L      Troponin T Delta --     Comment: Unable to calculate.                XR Chest 1 View    Result Date: 11/15/2024  XR CHEST 1 VW Date of Exam: 11/15/2024 8:13 PM EST Indication: Chest Pain Triage Protocol Comparison: None available. Findings: Mediastinum: Cardiomediastinal silhouette appears normal Lungs: The lungs are clear. Pleura: No pleural effusion or pneumothorax. Bones and soft tissues: No acute " osseous abnormality.     Impression: Impression: No acute intrathoracic finding Electronically Signed: Rohit White  11/15/2024 8:27 PM EST  Workstation ID: [id number]        MDM  Number of Diagnoses or Management Options  Chest pain, unspecified type  Diagnosis management comments: I assumed care of the patient from EUSEBIA Richardson.  Hemodynamically stable and afebrile.  Patient has serially negative troponins her symptoms have improved.  She otherwise well-appearing given return precautions care instructions and discharged.    I have reviewed the notes, assessments, and/or procedures performed by Jennifer LAWS, I concur with her/his documentation of Eliza Hilliard.        Amount and/or Complexity of Data Reviewed  Clinical lab tests: reviewed  Tests in the radiology section of CPT®: reviewed  Tests in the medicine section of CPT®: reviewed  Independent visualization of images, tracings, or specimens: yes        Initial impression of presenting illness: This is a 29-year-old female presents with complaints of palpitations and chest tightness that started about 2 hours prior to arrival.  Patient has been seen by cardiology 2 years ago for Holter monitor and had an unremarkable workup at that time.  Patient was given Toradol with some improvement of her symptoms.  Workup emergency department shows a normal troponin, undetectable, D-dimer and nonactionable labs.  Patient has a chest x-ray which shows no pneumonia.    DDX: includes but is not limited to: MI, PE, pneumonia, anxiety, palpitations, PVCs        Medications   sodium chloride 0.9 % flush 10 mL (has no administration in time range)   aspirin chewable tablet 324 mg (0 mg Oral Hold 11/15/24 1938)   ketorolac (TORADOL) injection 15 mg (15 mg Intravenous Given 11/15/24 2022)         Data interpreted: Nursing notes reviewed, vital signs reviewed.  Labs independently interpreted by me (CBC, CMP, lipase, UA, troponin, ABG, lactic acid, procalcitonin).  Imaging  independently interpreted by me (x-ray, CT scan).  EKG independently interpreted by me.  O2 saturation: 100    Counseling: Discussed the results above with the patient regarding need for admission or discharge.  Patient understands and agrees plan of care.      -----  ED Disposition       ED Disposition   Discharge    Condition   Stable    Comment   --             Final diagnoses:   Chest pain, unspecified type     Your Follow-Up Providers    Follow-up information has not been specified.       Contact information for after-discharge care    Follow-up information has not been specified.          Your medication list        CONTINUE taking these medications        Instructions Last Dose Given Next Dose Due   hydroxychloroquine 200 MG tablet  Commonly known as: PLAQUENIL      Take 1 tablet by mouth 2 (Two) Times a Day.       vitamin D 1.25 MG (01517 UT) capsule capsule  Commonly known as: ERGOCALCIFEROL      TAKE 1 CAPSULE BY MOUTH 1 TIME PER WEEK.

## 2024-11-20 LAB
QT INTERVAL: 360 MS
QT INTERVAL: 406 MS
QTC INTERVAL: 429 MS
QTC INTERVAL: 457 MS

## 2024-12-13 ENCOUNTER — OFFICE VISIT (OUTPATIENT)
Age: 29
End: 2024-12-13
Payer: COMMERCIAL

## 2024-12-13 VITALS
DIASTOLIC BLOOD PRESSURE: 76 MMHG | BODY MASS INDEX: 32.96 KG/M2 | WEIGHT: 205.1 LBS | HEART RATE: 83 BPM | HEIGHT: 66 IN | SYSTOLIC BLOOD PRESSURE: 108 MMHG | TEMPERATURE: 97.6 F

## 2024-12-13 DIAGNOSIS — M05.9 SEROPOSITIVE RHEUMATOID ARTHRITIS: Primary | Chronic | ICD-10-CM

## 2024-12-13 DIAGNOSIS — Z79.899 ENCOUNTER FOR LONG-TERM (CURRENT) USE OF HIGH-RISK MEDICATION: Chronic | ICD-10-CM

## 2024-12-13 PROCEDURE — 99214 OFFICE O/P EST MOD 30 MIN: CPT | Performed by: INTERNAL MEDICINE

## 2024-12-13 RX ORDER — LEVONORGESTREL 19.5 MG/1
INTRAUTERINE DEVICE INTRAUTERINE
COMMUNITY

## 2024-12-13 NOTE — ASSESSMENT & PLAN NOTE
*11/29/23: Pregnancy test negative, ESR 24 (<20 normal), CRP normal, CBC was fine, EBV IgM Normal, EBV IgG high, CMV not detected, Parvovirus IgM Normal, Parvovirus IgG High, JIN negative, Uric acid 3.3  * 11/14/23: ESR 21 (<20), RF negative  * 1/25/24: IgG RF+, IgM RF+   * Medications/treatments/interventions tried include: Tylenol, fluoxetine, ibuprofen, diclofenac, hydroxychloroquine, doxycycline.     1. Continue/refill hydroxychloroquine. She seems well.   2. She just had labs done 11/15/24. No labs needed today.   3. Follow up in 6 months   4. We gave her a handout on RA to take home and review       
Hydroxychloroquine 200 mg PO BID for RA   1st prescribed 2/8/24  Patient's taking hydroxychloroquine should have an eye exam at least once/year to monitor for signs of medication toxicity         
21-Oct-2019 21:34

## 2025-06-24 NOTE — ASSESSMENT & PLAN NOTE
*11/29/23: Pregnancy test negative, ESR 24 (<20 normal), CRP normal, CBC was fine, EBV IgM Normal, EBV IgG high, CMV not detected, Parvovirus IgM Normal, Parvovirus IgG High, JIN negative, Uric acid 3.3  * 11/14/23: ESR 21 (<20), RF negative  * 1/25/24: IgG RF+, IgM RF+   * Medications/treatments/interventions tried include: Tylenol, fluoxetine, ibuprofen, diclofenac, hydroxychloroquine, doxycycline.     1. She has stopped taking her hydroxychloroquine and has remained stable.   2. She just had labs done today with her PCP- no results yet  3. Follow up in 6 months   4. Reach out if she needs us in the interim

## 2025-06-24 NOTE — ASSESSMENT & PLAN NOTE
Hydroxychloroquine 200 mg PO BID for RA DISCONTINUED   1st prescribed 2/8/24 stopped 6/2025 patient preference.   Patient's taking hydroxychloroquine should have an eye exam at least once/year to monitor for signs of medication toxicity

## 2025-06-24 NOTE — PROGRESS NOTES
Office Follow Up      Date: 06/25/2025   Patient Name: Eliza Hilliard  MRN: [id number]  YOB: 1995    Referring Physician: No ref. provider found     Chief Complaint   Patient presents with    Seropositive rheumatoid arthritis       History of Present Illness: Eliza Hilliard is a 29 y.o. female who is here today for follow up.     She established care with us as of 12/21/23. Labs done 1/25/24 showed her to have a + test for Lyme disease. She was prescribed a 28 day course of doxycycline which she has completed. She was also noted to have a + IgM and IgG RF tests. We prescribed her hydroxychloroquine for RA. Her vitamin D level was low so we prescribed 8 weeks of ergocalciferol which she completed.     No recent serious injuries or infections. No fever.     Today she rates her pain as 7/10 in severity. She denies morning stiffness.     Since last OV She has stopped her plaquenil, she was forgetting to take it and felt that it was not clearly helping her.     She had an annual visit with her PCP today who did labs which have not resulted.     Subjective     Review of Systems   Gastrointestinal:  Positive for nausea.   Musculoskeletal:  Positive for back pain, myalgias and neck pain.   Psychiatric/Behavioral:  Positive for sleep disturbance.           Current Outpatient Medications:     Cholecalciferol 25 MCG (1000 UT) tablet, Take 2 tablets by mouth Daily., Disp: , Rfl:     levonorgestrel (Kyleena) 19.5 MG intrauterine device IUD, To be inserted one time by prescriber. Route intrauterine., Disp: , Rfl:     lisdexamfetamine (VYVANSE) 20 MG capsule, , Disp: , Rfl:     naproxen sodium (ALEVE) 220 MG tablet, Take 1 tablet by mouth 2 (Two) Times a Day As Needed for Mild Pain., Disp: , Rfl:     Allergies   Allergen Reactions    Dust Mite Extract Cough       I have reviewed and updated the patient's chief complaint, history of present illness, review of systems, past medical history,  "surgical history, family history, social history, medications and allergy list as appropriate.     Objective      Vitals:    06/25/25 1421   BP: 104/76   BP Location: Left arm   Pulse: 86   Temp: 97.9 °F (36.6 °C)   Weight: 84.5 kg (186 lb 4.8 oz)   Height: 167.6 cm (66\")   PainSc: 6    PainLoc: Shoulder  Comment: Right       Body mass index is 30.07 kg/m².      Physical Exam     General: Well appearing 29 year old  female. Not in distress. She is ambulating unassisted.   SKIN: No rashes. No alopecia. No subcutaneous nodules. No digital pits or ulcers. No sclerodactyly.   HEENT: NCAT. Conjunctiva clear, no photophobia. No oral or nasal ulcers. Hearing intact.    Pulmonary: Clear to auscultation bilaterally. No wheezing, rales, or rhonchi. Effort normal   CV: Regular rate and rhythm. No murmurs, rubs, or gallops.   Psych: Normal mood and affect. Alert and oriented x 3.   Extremities: No cyanosis or edema.   Musculoskeletal: No joint swelling or tenderness to palpation. No warmth or erythema. Normal range of motion of the wrists, ankles, elbows, and knees. No synovitis    Lymph: No palpable cervical adenopathy      Procedures    Assessment / Plan      Assessment & Plan  Seropositive rheumatoid arthritis  *11/29/23: Pregnancy test negative, ESR 24 (<20 normal), CRP normal, CBC was fine, EBV IgM Normal, EBV IgG high, CMV not detected, Parvovirus IgM Normal, Parvovirus IgG High, JIN negative, Uric acid 3.3  * 11/14/23: ESR 21 (<20), RF negative  * 1/25/24: IgG RF+, IgM RF+   * Medications/treatments/interventions tried include: Tylenol, fluoxetine, ibuprofen, diclofenac, hydroxychloroquine, doxycycline.     1. She has stopped taking her hydroxychloroquine and has remained stable.   2. She just had labs done today with her PCP- no results yet  3. Follow up in 6 months   4. Reach out if she needs us in the interim           Encounter for long-term (current) use of high-risk medication  Hydroxychloroquine " 200 mg PO BID for RA DISCONTINUED   1st prescribed 2/8/24 stopped 6/2025 patient preference.   Patient's taking hydroxychloroquine should have an eye exam at least once/year to monitor for signs of medication toxicity           Follow Up:   Return in about 6 months (around 12/25/2025) for Dr. Fernando.      VONNIE Barney    [facility] [place] [facility]

## 2025-06-25 ENCOUNTER — OFFICE VISIT (OUTPATIENT)
Age: 30
End: 2025-06-25
Payer: COMMERCIAL

## 2025-06-25 VITALS
WEIGHT: 186.3 LBS | DIASTOLIC BLOOD PRESSURE: 76 MMHG | BODY MASS INDEX: 29.94 KG/M2 | TEMPERATURE: 97.9 F | HEART RATE: 86 BPM | SYSTOLIC BLOOD PRESSURE: 104 MMHG | HEIGHT: 66 IN

## 2025-06-25 DIAGNOSIS — Z79.899 ENCOUNTER FOR LONG-TERM (CURRENT) USE OF HIGH-RISK MEDICATION: Chronic | ICD-10-CM

## 2025-06-25 DIAGNOSIS — M05.9 SEROPOSITIVE RHEUMATOID ARTHRITIS: Primary | Chronic | ICD-10-CM

## 2025-06-25 RX ORDER — CHOLECALCIFEROL (VITAMIN D3) 25 MCG
2000 TABLET ORAL DAILY
COMMUNITY

## 2025-06-25 RX ORDER — NAPROXEN SODIUM 220 MG/1
220 TABLET, FILM COATED ORAL 2 TIMES DAILY PRN
COMMUNITY

## 2025-06-25 RX ORDER — LISDEXAMFETAMINE DIMESYLATE 20 MG/1
CAPSULE ORAL
COMMUNITY
Start: 2025-06-03

## 2025-06-27 NOTE — PROGRESS NOTES
Office Follow Up      Date: 12/13/2024   Patient Name: Eliza Hilliard  MRN: [id number]  YOB: 1995    Referring Physician: No ref. provider found     Chief Complaint   Patient presents with    Rheumatoid Arthritis     Follow up        History of Present Illness: Eliza Hilliard is a 29 y.o. female who is here today for follow up.       She established care with us as of 12/21/23. Labs done 1/25/24 showed her to have a + test for Lyme disease. She was prescribed a 28 day course of doxycycline which she has completed. She was also noted to have a + IgM and IgG RF tests. We prescribed her hydroxychloroquine for RA. Her vitamin D level was low so we prescribed 8 weeks of ergocalciferol which she completed.     She reports significant improvement in her joint symptoms since starting Plaquenil.  Medications well-tolerated.     No recent serious injuries or infections. No fever.     Today she rates her pain as 2/10 in severity. She has 30 minutes/day of morning stiffness. No red or hot joints. No joint swelling. No muscle pain or weakness. She has back and neck pain. Her neck is stiff. She has trouble walking.     No sicca symptoms. No shortness of breath. No chest pain. No GI or  issues. No headaches or paresthesias. No rash. No hair loss. She is fatigued.       Subjective     Review of Systems   Constitutional:  Positive for fatigue.   HENT: Negative.     Eyes: Negative.    Respiratory: Negative.     Cardiovascular: Negative.    Gastrointestinal: Negative.    Endocrine: Negative.    Genitourinary: Negative.    Musculoskeletal:  Positive for arthralgias, back pain, gait problem, neck pain and neck stiffness.   Skin: Negative.    Allergic/Immunologic: Positive for environmental allergies.   Hematological: Negative.    Psychiatric/Behavioral: Negative.     All other systems reviewed and are negative.         Current Outpatient Medications:     hydroxychloroquine (PLAQUENIL) 200 MG  "tablet, Take 1 tablet by mouth 2 (Two) Times a Day., Disp: 60 tablet, Rfl: 2    levonorgestrel (Kyleena) 19.5 MG intrauterine device IUD, To be inserted one time by prescriber. Route intrauterine., Disp: , Rfl:     vitamin D (ERGOCALCIFEROL) 1.25 MG (23268 UT) capsule capsule, TAKE 1 CAPSULE BY MOUTH 1 TIME PER WEEK., Disp: 12 capsule, Rfl: 0    Allergies   Allergen Reactions    Dust Mite Extract Cough       I have reviewed and updated the patient's chief complaint, history of present illness, review of systems, past medical history, surgical history, family history, social history, medications and allergy list as appropriate.     Objective      Vitals:    12/13/24 0835   BP: 108/76   BP Location: Left arm   Patient Position: Sitting   Cuff Size: Adult   Pulse: 83   Temp: 97.6 °F (36.4 °C)   Weight: 93 kg (205 lb 1.6 oz)   Height: 167.6 cm (65.98\")   PainSc:   2     Body mass index is 33.12 kg/m².      Physical Exam       General: Well appearing 29 year old female. Not in distress. She is ambulating unassisted.   SKIN: No rashes. No alopecia. No subcutaneous nodules. No digital pits or ulcers. No sclerodactyly.   HEENT: NCAT. Conjunctiva clear, no photophobia. No oral or nasal ulcers. Hearing intact.    Pulmonary: Clear to auscultation bilaterally. No wheezing, rales, or rhonchi.  CV: Regular rate and rhythm. No murmurs, rubs, or gallops.   Psych: Normal mood and affect. Alert and oriented x 3.   Extremities: No cyanosis or edema.   Musculoskeletal: No joint swelling or tenderness to palpation. No warmth or erythema. Normal range of motion of the wrists, ankles, elbows, and knees.   Lymph: No palpable cervical adenopathy      Procedures    Assessment / Plan      Assessment & Plan  Seropositive rheumatoid arthritis  *11/29/23: Pregnancy test negative, ESR 24 (<20 normal), CRP normal, CBC was fine, EBV IgM Normal, EBV IgG high, CMV not detected, Parvovirus IgM Normal, Parvovirus IgG High, JIN negative, Uric acid " 3.3  * 11/14/23: ESR 21 (<20), RF negative  * 1/25/24: IgG RF+, IgM RF+   * Medications/treatments/interventions tried include: Tylenol, fluoxetine, ibuprofen, diclofenac, hydroxychloroquine, doxycycline.     1. Continue/refill hydroxychloroquine. She seems well.   2. She just had labs done 11/15/24. No labs needed today.   3. Follow up in 6 months   4. We gave her a handout on RA to take home and review       Encounter for long-term (current) use of high-risk medication  Hydroxychloroquine 200 mg PO BID for RA   1st prescribed 2/8/24  Patient's taking hydroxychloroquine should have an eye exam at least once/year to monitor for signs of medication toxicity           Follow Up:   Return in about 6 months (around 6/13/2025).      Lam Fernando DO  [facility] [place] [facility] [place]    n/a